# Patient Record
Sex: FEMALE | Race: WHITE | NOT HISPANIC OR LATINO | Employment: OTHER | URBAN - METROPOLITAN AREA
[De-identification: names, ages, dates, MRNs, and addresses within clinical notes are randomized per-mention and may not be internally consistent; named-entity substitution may affect disease eponyms.]

---

## 2020-02-11 ENCOUNTER — HOSPITAL ENCOUNTER (OUTPATIENT)
Dept: MEDSURG UNIT | Facility: CLINIC | Age: 45
Discharge: HOME | End: 2020-02-11
Attending: FAMILY MEDICINE | Admitting: FAMILY MEDICINE

## 2020-02-11 DIAGNOSIS — R11.2 NAUSEA AND VOMITING, INTRACTABILITY OF VOMITING NOT SPECIFIED, UNSPECIFIED VOMITING TYPE: ICD-10-CM

## 2020-02-11 LAB
ALBUMIN UR-MCNC: NEGATIVE MG/DL
APPEARANCE UR: CLEAR
BACTERIA #/AREA URNS HPF: ABNORMAL HPF
BILIRUB UR QL STRIP: NEGATIVE
CLUE CELLS: NORMAL
COLOR UR AUTO: YELLOW
GLUCOSE UR STRIP-MCNC: NEGATIVE MG/DL
HGB UR QL STRIP: ABNORMAL
KETONES UR STRIP-MCNC: ABNORMAL MG/DL
LEUKOCYTE ESTERASE UR QL STRIP: NEGATIVE
NITRATE UR QL: NEGATIVE
PH UR STRIP: 6 [PH] (ref 4.5–8)
RBC #/AREA URNS AUTO: ABNORMAL HPF
RUPTURE OF FETAL MEMBRANES BY ROM PLUS: NEGATIVE
SP GR UR STRIP: 1 (ref 1–1.03)
SQUAMOUS #/AREA URNS AUTO: ABNORMAL LPF
TRICHOMONAS, WET PREP: NORMAL
UROBILINOGEN UR STRIP-ACNC: ABNORMAL
WBC #/AREA URNS AUTO: ABNORMAL HPF
YEAST, WET PREP: NORMAL

## 2020-02-11 RX ORDER — ASPIRIN 81 MG/1
81 TABLET, CHEWABLE ORAL
Status: ON HOLD | COMMUNITY
Start: 2019-10-07 | End: 2023-12-12

## 2020-02-11 RX ORDER — ONDANSETRON 4 MG/1
4 TABLET, FILM COATED ORAL EVERY 8 HOURS PRN
Qty: 10 TABLET | Refills: 0 | Status: SHIPPED | OUTPATIENT
Start: 2020-02-11 | End: 2023-12-11

## 2020-02-11 ASSESSMENT — MIFFLIN-ST. JEOR: SCORE: 1717.96

## 2021-06-04 VITALS — BODY MASS INDEX: 39.49 KG/M2 | WEIGHT: 237 LBS | HEIGHT: 65 IN

## 2021-06-06 NOTE — PROGRESS NOTES
Pt reports that her nausea is resolved, she has been able to drink apple juice without emesis. She denies contractions, cramping, or pain. FM+. IV fluid bolus completed. IV dc'd intact.    Pt given DC instructions. She verbalized understanding and had no further questions or concerns. She had her belongings in her possession and ambulated upon dc from unit accompanied by her sister.

## 2021-06-06 NOTE — PROGRESS NOTES
Pt reported to triage complaining of intermittent lower back pain. Pt has had nausea/vomiting since last evening. Pt is around 32 weeks. Reports no bleeding, but possibly leaking of fluid? Pt gets prenatal care in [place] mom got a pacemaker at [facility]. FHR very difficult to trace due to fetal movement. Resident notified.     Clarice hBatti RN

## 2021-06-06 NOTE — PROGRESS NOTES
Pt reports that her abdominal pain has subsided. She has some left-sided CVA tenderness present. FM+. She does not feel contractions or cramping. EFM taken off at this time. IV fluid bolus running, IV zofran just administered. Wet prep and ROM+ pending. Pt does not feel she can give a urine sample yet.     Pt reports her last emesis and diarrhea was at 0900 this morning. She c/o constant nausea. She has been able to sip water throughout the day, but has not eaten due to her nausea.

## 2021-06-28 NOTE — PROGRESS NOTES
"Progress Notes by Betty Sierra MD at 2020  7:17 PM     Author: Betty Sierra MD Service: Family Medicine Author Type: Resident    Filed: 2020  9:07 PM Date of Service: 2020  7:17 PM Status: Attested    : Betty Sierra MD (Resident) Cosigner: Blas Willoughby MD at 2020  3:39 PM    Attestation signed by Blas Willoughby MD at 2020  3:39 PM    I discussed this patient with Dr Sierra on  and agree with the plan as outlined.                  [facility] OB Triage Note    CC: Abdominal pain, vomiting    Subjective: Citlalli Tucker is a  44 y.o. at about 32 weeks gestation,  EDC of 20. with a current prenatal history significant for advanced maternal age and hx of dvt, on aspirin and ?lovenox who presents to OB triage with a one day of nausea, vomiting, diarrhea, and abdominal pain . Patient reports RUQ pain last night that has transitioned to lower quadrant and lower back pain. Denies loss of fluids and vaginal bleeding. Denies HA, vision changes, SOB, new CP, cough, dysuria, LE swelling, and pain in her calves. See notes that she was started on a blood thinner injection (?lovenox) about a month ago. She ran out of the prescription at beginning of February, and hasn't been using it in about 1.5 weeks.     Ms. Tucker notes that she thinks she has a history of gallstones found on ultrasound in the past.     Estimated Date of Delivery:  20, by early US OB provider: Provider, No Primary Care  OB History        4    Para   2    Term   2            AB   1    Living   2       SAB        TAB   1    Ectopic        Multiple        Live Births   2                 Objective:   Blood pressure 116/55, pulse 100, temperature 99.3  F (37.4  C), temperature source Oral, resp. rate 18, height 5' 4.5\" (1.638 m), weight (!) 237 lb (107.5 kg), not currently breastfeeding.  General:   alert, appears stated age and cooperative   Skin:   " normal and no rash or abnormalities   HEENT:  PERRLA and extra ocular movement intact   Lungs:   clear to auscultation bilaterally   Heart:   borderline tachy with regular rhythm, S1, S2 normal, no murmur, click, rub or gallop   Extremities: Warm, dry and well perfused. No edema.   Abdomen: FHT present   Membranes intact   [place]:  None   FHT: Baseline: 145 bpm, Variability: Moderate (6 - 25 bpm), Accelerations: Absent and Decelerations: Absent     Laboratory Studies:   Results for orders placed or performed during the hospital encounter of 20   Collect and send wet prep vaginal specimen as indicated by prenatal history and/or patient complaints   Result Value Ref Range    Yeast Result No yeast seen No yeast seen    Trichomonas No Trichomonas seen No Trichomonas seen    Clue Cells, Wet Prep No Clue cells seen No Clue cells seen   Rupture of Membrane Test or Screen   Result Value Ref Range    Rupture Fetal Membrane Negative Negative   Urinalysis-UC if Indicated   Result Value Ref Range    Color, UA Yellow Colorless, Yellow, Straw, Light Yellow    Clarity, UA Clear Clear    Glucose, UA Negative Negative    Bilirubin, UA Negative Negative    Ketones, UA 25 mg/dL (!) Negative    Specific Gravity, UA 1.005 1.001 - 1.030    Blood, UA Small (!) Negative    pH, UA 6.0 4.5 - 8.0    Protein, UA Negative Negative mg/dL    Urobilinogen, UA <2.0 E.U./dL <2.0 E.U./dL, 2.0 E.U./dL    Nitrite, UA Negative Negative    Leukocytes, UA Negative Negative    Bacteria, UA None Seen None Seen hpf    RBC, UA 0-2 None Seen, 0-2 hpf    WBC, UA 0-5 None Seen, 0-5 hpf    Squam Epithel, UA 0-5 None Seen, 0-5 lpf        ASSESSMENT AND PLAN: Citlalli Tucker is a  44 y.o. who presented to [facility] on 2020 with hx of nausea and vomiting with associated abdominal pain. DDX includes dehydration secondary to viral gastrointestinal infection, biliary colic, UTI, and vaginal infection. Patient given 1000  ml IV fluids and 4mg of IV zofran. Nausea and abdominal pain improved. Was able to tolerate apple juice. ROM negative. Wet prep negative. UA positive for ketones and small blood (but only 1-2 RBC on micro). Most consistent with dehydration secondary to GI virus.      1. Not in labor.  2. PO zofran 4mg, Q8hr for nausea; 10 tablets  3. Patient advised to focus on oral rehydration and recommended picking up Pedialyte from the drug store. Advised to return to the ED if she develops worsening nausea/vomiting or abdominal pain.   4. Patient to follow up with OB provider as scheduled.     Patient discussed with attending physician, Dr. Blas Willoughby, who agrees with the plan.      Betty Sierra MD, PGY1  [facility] Medicine   2/11/2020

## 2023-11-27 NOTE — H&P (VIEW-ONLY)
[facility]      Preoperative Consultation   Citlalli Tucker   : 1975   Gender: female    Date of Encounter: 2023    Nursing Notes:   Alma Tavarez  2023 11:17 AM  Signed  Citlalli Tucker is a 48 y.o. female (1975) who presents for  C4-7 ACDF     Date of Surgery: 23  Surgical Specialty: Orthopedic/Spine  Fred[facility] Nath [facility]  Hospital/Surgical Facility:  [facility]  Fax number: [phone]  Surgery type: inpatient  Primary Physician: Md Alma Correia LPN 2023 11:16 AM         History of Present Illness   Patient is being seen today for a preoperative visit for some type of cervical surgery, however the patient is unaware of the name of the procedure and nothing is on record to the type of surgery. There is a note that it was a possibility to have C4-5, C5-6, and C6-7 anterior discectomy, decompression and fusion.     She has a history of C4-7 stenosis and disk degeneration, sleep apnea, obesity, DVT 2015, MRSA.     Recent illnesses, fevers, nausea, vomiting, diarrhea: None  Exposure to anyone with illness: None  History of anesthesia reactions: None  Every had anesthesia: Yes, tolerated well.    Family history of anesthesia reactions: None  Smoker: None  Alcohol or illegal drug use: Recreational alcohol, no drugs.   Shortness or breath, chest pain: None    Reviewed past and current medical history, surgical history, family history, social history, allergies, and medications.      Review of Systems   A comprehensive review of systems was negative except for items noted in HPI.    Patient Active Problem List   Diagnosis Code     Sleep apnea, obstructive G47.33     Knee pain M25.569     Chondromalacia of multiple sites M94.29     Morbid obesity (HC) E66.01     DVT (deep venous thrombosis), right I82.409     Patellofemoral stress syndrome of right knee M22.2X1     Osteoarthritis of left knee M17.12     MRSA (methicillin  "resistant staph aureus) culture positive Z22.322     Cervical radiculitis M54.12     Spondylosis without myelopathy or radiculopathy, lumbar region M47.816     No current outpatient medications on file.     No current facility-administered medications for this visit.     Allergies   Allergen Reactions     Sulfa (Sulfonamide Antibiotics) GI Upset     Past Surgical History:   . Laterality Date     ankle surgery  1995    ankle surgery Rt     CRYOTHERAPY OF CERVIX  2000    normal paps since     Social History     Tobacco Use     Smoking status: Former     Smokeless tobacco: Never     Tobacco comments:     No passive exposure.  pt smokes occ.   Vaping Use     Vaping Use: Never used   Substance Use Topics     Alcohol use: Yes     Alcohol/week: 0.0 standard drinks of alcohol     Comment: occasional     Drug use: No     Family History   Problem Relation Age of Onset     Other Mother         BKA due to infection in toenail., PAD     Valvular heart disease Mother      Frontotemporal dementia Mother      Hypertension Father      Heart Disease Father         CABG, stents     Hypertension Sister      Bipolar disorder Brother      Anxiety disorder Brother      Unknown Maternal Grandmother      Unknown Maternal Grandfather      Cancer Paternal Grandmother         brain stem??     Other Paternal Grandfather         old age       PAST DIFFICULTY WITH ANESTHESIA: None     Physical Exam   /74 (Cuff Site: Right Arm, Position: Sitting, Cuff Size: Adult Regular Long)   Pulse 80   Temp 97.5  F (36.4  C) (Temporal)   Resp 17   Ht 1.607 m (5' 3.25\")   Wt 104.4 kg (230 lb 3.2 oz)   SpO2 97%   Breastfeeding No   BMI 40.46 kg/m   Body mass index is 40.46 kg/m .  Physical Exam  Constitutional:       Appearance: Normal appearance.   HENT:      Head: Normocephalic and atraumatic.      Mouth/Throat:      Mouth: Mucous membranes are dry.      Pharynx: Oropharynx is clear.   Cardiovascular:      Rate and Rhythm: Normal rate and regular " rhythm.      Heart sounds: Normal heart sounds.   Pulmonary:      Effort: Pulmonary effort is normal.      Breath sounds: Normal breath sounds.   Abdominal:      General: Bowel sounds are normal.      Palpations: Abdomen is soft.   Musculoskeletal:      Right lower leg: No edema.      Left lower leg: No edema.   Skin:     General: Skin is warm and dry.   Neurological:      Mental Status: She is alert and oriented to person, place, and time. Mental status is at baseline.   Psychiatric:         Mood and Affect: Mood normal.         Behavior: Behavior normal.         Thought Content: Thought content normal.         Judgment: Judgment normal.            Assessment / Plan     The Pre-Op Tool    Recommendations      Intermediate Risk Procedure    Risk of CV Complication (RCRI)  0.5%    Current Cardiac Status  Good exertional capacity ( > 4 mets )    Cardiac History  No history of coronary artery disease    Sleep Apnea  History of obstructive sleep apnea, not on treatment           Labs  HGB within last 30 days  EKG  Not indicated  CXR  Not indicated    Stress Testing  Not indicated    * Testing recommendations are intended to assist, but not direct, clinical decisions.           Type & Screen should be obtained by Anesthesia only if the risk of transfusion is > 5% for the procedure         Hold vitamins and/or supplements for 1 week prior to the procedure  Hold fish oil for 2 weeks prior to the procedure  Okay to take Acetaminophen (Tylenol) up until the procedure    * Medication recommendations are not intended to be exhaustive; they are limited to common medications that are potentially dangerous if incorrectly managed          Labs  * Data supports elimination of  routine  laboratory testing in favor of focused,  indicated  testing based on medical co-morbidities. A 2009 study randomized 1061 patients undergoing ambulatory, non-cataract surgery to routine or to indicated testing. Perioperative adverse events were  similar (Anesthesia & Analgesia 2009;108:467-75; Anesthesiol. Clin. 2016 Mar;34(1):43-58).  EKG  * Age alone is not an absolute indication for a preoperative EKG, and in the absence of clinical risk factors, there is no consensus on the utility of routine preoperative EKG. Our experts recommend against obtaining an EKG if age < 65 for intermediate risk procedures (Anesthesology. 2012;116(3) 1-17; JACC. 2014;64(21);e1-76).  Stress Testing  * The current ACC/AHA guideline states that 'non-invasive stress testing is not useful for patients [with no clinical risk factors] undergoing noncardiac surgery' (JACC. 2014;64(21);e1-76.).     Session ID: [id number]  Endnotes and bibliography available upon request: [email or web address]    Labs: hemoglobin, normal.   ECG: no      ICD-10-CM    1. Pre-op exam  Z01.818 HEMOGLOBIN      2. Neck pain  M54.2       3. Other cervical disc degeneration at C4-C5 level  M50.321 HEMOGLOBIN      4. Other cervical disc degeneration at C5-C6 level  M50.322 HEMOGLOBIN      5. Other cervical disc degeneration at C6-C7 level  M50.323 HEMOGLOBIN      6. Cervical stenosis of spine  M48.02 HEMOGLOBIN        Hemoglobin is stable. No cardiopulmonary history.     Patient is cleared for planned procedure.   Electronically Signed by:   Juliane Newman NP  11/27/2023    Total time preparing to see this patient, face-to-face time, and coordinating care time on the same calendar date: 26 minutes.

## 2023-12-12 ENCOUNTER — ANESTHESIA EVENT (OUTPATIENT)
Dept: SURGERY | Facility: CLINIC | Age: 48
End: 2023-12-12
Payer: COMMERCIAL

## 2023-12-12 ENCOUNTER — APPOINTMENT (OUTPATIENT)
Dept: RADIOLOGY | Facility: CLINIC | Age: 48
End: 2023-12-12
Attending: ORTHOPAEDIC SURGERY
Payer: COMMERCIAL

## 2023-12-12 ENCOUNTER — HOSPITAL ENCOUNTER (INPATIENT)
Facility: CLINIC | Age: 48
LOS: 3 days | Discharge: HOME HEALTH | End: 2023-12-15
Attending: ORTHOPAEDIC SURGERY | Admitting: ORTHOPAEDIC SURGERY
Payer: COMMERCIAL

## 2023-12-12 ENCOUNTER — ANESTHESIA (OUTPATIENT)
Dept: SURGERY | Facility: CLINIC | Age: 48
End: 2023-12-12
Payer: COMMERCIAL

## 2023-12-12 DIAGNOSIS — F32.A DEPRESSIVE DISORDER: ICD-10-CM

## 2023-12-12 DIAGNOSIS — I10 PRIMARY HYPERTENSION: ICD-10-CM

## 2023-12-12 DIAGNOSIS — M48.02 CERVICAL SPINAL STENOSIS: Primary | ICD-10-CM

## 2023-12-12 LAB — GLUCOSE BLDC GLUCOMTR-MCNC: 86 MG/DL (ref 70–99)

## 2023-12-12 PROCEDURE — 360N000085 HC SURGERY LEVEL 5 W/ FLUORO, PER MIN: Performed by: ORTHOPAEDIC SURGERY

## 2023-12-12 PROCEDURE — 250N000009 HC RX 250: Performed by: EMERGENCY MEDICINE

## 2023-12-12 PROCEDURE — 250N000011 HC RX IP 250 OP 636: Mod: JZ | Performed by: NURSE ANESTHETIST, CERTIFIED REGISTERED

## 2023-12-12 PROCEDURE — 0RT30ZZ RESECTION OF CERVICAL VERTEBRAL DISC, OPEN APPROACH: ICD-10-PCS | Performed by: ORTHOPAEDIC SURGERY

## 2023-12-12 PROCEDURE — 250N000011 HC RX IP 250 OP 636: Performed by: PHYSICIAN ASSISTANT

## 2023-12-12 PROCEDURE — 250N000009 HC RX 250: Performed by: NURSE ANESTHETIST, CERTIFIED REGISTERED

## 2023-12-12 PROCEDURE — C1762 CONN TISS, HUMAN(INC FASCIA): HCPCS | Performed by: ORTHOPAEDIC SURGERY

## 2023-12-12 PROCEDURE — 710N000010 HC RECOVERY PHASE 1, LEVEL 2, PER MIN: Performed by: ORTHOPAEDIC SURGERY

## 2023-12-12 PROCEDURE — 370N000017 HC ANESTHESIA TECHNICAL FEE, PER MIN: Performed by: ORTHOPAEDIC SURGERY

## 2023-12-12 PROCEDURE — 250N000009 HC RX 250: Performed by: ORTHOPAEDIC SURGERY

## 2023-12-12 PROCEDURE — 258N000003 HC RX IP 258 OP 636: Performed by: ANESTHESIOLOGY

## 2023-12-12 PROCEDURE — 250N000013 HC RX MED GY IP 250 OP 250 PS 637: Performed by: PHYSICIAN ASSISTANT

## 2023-12-12 PROCEDURE — 250N000011 HC RX IP 250 OP 636: Performed by: ORTHOPAEDIC SURGERY

## 2023-12-12 PROCEDURE — 99254 IP/OBS CNSLTJ NEW/EST MOD 60: CPT | Performed by: EMERGENCY MEDICINE

## 2023-12-12 PROCEDURE — 258N000003 HC RX IP 258 OP 636: Performed by: NURSE ANESTHETIST, CERTIFIED REGISTERED

## 2023-12-12 PROCEDURE — 250N000025 HC SEVOFLURANE, PER MIN: Performed by: ORTHOPAEDIC SURGERY

## 2023-12-12 PROCEDURE — 250N000013 HC RX MED GY IP 250 OP 250 PS 637: Performed by: ANESTHESIOLOGY

## 2023-12-12 PROCEDURE — 250N000011 HC RX IP 250 OP 636: Mod: JZ | Performed by: ANESTHESIOLOGY

## 2023-12-12 PROCEDURE — 258N000003 HC RX IP 258 OP 636: Performed by: PHYSICIAN ASSISTANT

## 2023-12-12 PROCEDURE — 999N000182 XR SURGERY CARM FLUORO GREATER THAN 5 MIN: Mod: TC

## 2023-12-12 PROCEDURE — 999N000141 HC STATISTIC PRE-PROCEDURE NURSING ASSESSMENT: Performed by: ORTHOPAEDIC SURGERY

## 2023-12-12 PROCEDURE — C1713 ANCHOR/SCREW BN/BN,TIS/BN: HCPCS | Performed by: ORTHOPAEDIC SURGERY

## 2023-12-12 PROCEDURE — 250N000013 HC RX MED GY IP 250 OP 250 PS 637: Performed by: ORTHOPAEDIC SURGERY

## 2023-12-12 PROCEDURE — 0RG20A0 FUSION OF 2 OR MORE CERVICAL VERTEBRAL JOINTS WITH INTERBODY FUSION DEVICE, ANTERIOR APPROACH, ANTERIOR COLUMN, OPEN APPROACH: ICD-10-PCS | Performed by: ORTHOPAEDIC SURGERY

## 2023-12-12 PROCEDURE — 272N000001 HC OR GENERAL SUPPLY STERILE: Performed by: ORTHOPAEDIC SURGERY

## 2023-12-12 PROCEDURE — 120N000001 HC R&B MED SURG/OB

## 2023-12-12 DEVICE — GRAFT BONE PASTE GRAFTON 5ML T45005: Type: IMPLANTABLE DEVICE | Site: SPINE CERVICAL | Status: FUNCTIONAL

## 2023-12-12 DEVICE — IMP SPACER MEDT CERVICAL ANATOMIC PEEK PTC 16X14X6MM 5030664: Type: IMPLANTABLE DEVICE | Site: SPINE CERVICAL | Status: FUNCTIONAL

## 2023-12-12 DEVICE — GRAFT BONE CHIPS CANC CUBE 15CC 100315: Type: IMPLANTABLE DEVICE | Site: SPINE CERVICAL | Status: FUNCTIONAL

## 2023-12-12 DEVICE — IMPLANTABLE DEVICE: Type: IMPLANTABLE DEVICE | Site: SPINE CERVICAL | Status: FUNCTIONAL

## 2023-12-12 DEVICE — IMP SCR CERVICAL MEDT ATLANTIS 4.0X14MM SD FA 3120414: Type: IMPLANTABLE DEVICE | Site: SPINE CERVICAL | Status: FUNCTIONAL

## 2023-12-12 DEVICE — IMP SPACER MEDT CERV ANATOMIC PEEK PTC 16X14X7MM 5030764: Type: IMPLANTABLE DEVICE | Site: SPINE CERVICAL | Status: FUNCTIONAL

## 2023-12-12 DEVICE — IMP SCR CERVICAL MEDT ATLANTIS 4.0X14MM SD VA 3120514: Type: IMPLANTABLE DEVICE | Site: SPINE CERVICAL | Status: FUNCTIONAL

## 2023-12-12 RX ORDER — PROCHLORPERAZINE MALEATE 10 MG
10 TABLET ORAL EVERY 6 HOURS PRN
Status: DISCONTINUED | OUTPATIENT
Start: 2023-12-12 | End: 2023-12-15 | Stop reason: HOSPADM

## 2023-12-12 RX ORDER — METHOCARBAMOL 750 MG/1
750 TABLET, FILM COATED ORAL EVERY 6 HOURS PRN
Status: DISCONTINUED | OUTPATIENT
Start: 2023-12-12 | End: 2023-12-15 | Stop reason: HOSPADM

## 2023-12-12 RX ORDER — AMOXICILLIN 250 MG
1 CAPSULE ORAL 2 TIMES DAILY
Status: DISCONTINUED | OUTPATIENT
Start: 2023-12-12 | End: 2023-12-15 | Stop reason: HOSPADM

## 2023-12-12 RX ORDER — NALOXONE HYDROCHLORIDE 0.4 MG/ML
0.2 INJECTION, SOLUTION INTRAMUSCULAR; INTRAVENOUS; SUBCUTANEOUS
Status: DISCONTINUED | OUTPATIENT
Start: 2023-12-12 | End: 2023-12-15 | Stop reason: HOSPADM

## 2023-12-12 RX ORDER — CEFAZOLIN SODIUM/WATER 2 G/20 ML
2 SYRINGE (ML) INTRAVENOUS
Status: COMPLETED | OUTPATIENT
Start: 2023-12-12 | End: 2023-12-12

## 2023-12-12 RX ORDER — LIDOCAINE HYDROCHLORIDE 10 MG/ML
INJECTION, SOLUTION INFILTRATION; PERINEURAL PRN
Status: DISCONTINUED | OUTPATIENT
Start: 2023-12-12 | End: 2023-12-12

## 2023-12-12 RX ORDER — DIPHENHYDRAMINE HCL 25 MG
25 CAPSULE ORAL EVERY 6 HOURS PRN
Status: DISCONTINUED | OUTPATIENT
Start: 2023-12-12 | End: 2023-12-12 | Stop reason: HOSPADM

## 2023-12-12 RX ORDER — NALOXONE HYDROCHLORIDE 0.4 MG/ML
0.4 INJECTION, SOLUTION INTRAMUSCULAR; INTRAVENOUS; SUBCUTANEOUS
Status: DISCONTINUED | OUTPATIENT
Start: 2023-12-12 | End: 2023-12-15 | Stop reason: HOSPADM

## 2023-12-12 RX ORDER — HYDROMORPHONE HCL IN WATER/PF 6 MG/30 ML
0.4 PATIENT CONTROLLED ANALGESIA SYRINGE INTRAVENOUS EVERY 5 MIN PRN
Status: DISCONTINUED | OUTPATIENT
Start: 2023-12-12 | End: 2023-12-12 | Stop reason: HOSPADM

## 2023-12-12 RX ORDER — OXYCODONE HYDROCHLORIDE 5 MG/1
10 TABLET ORAL EVERY 4 HOURS PRN
Status: DISCONTINUED | OUTPATIENT
Start: 2023-12-12 | End: 2023-12-14

## 2023-12-12 RX ORDER — SODIUM CHLORIDE, SODIUM LACTATE, POTASSIUM CHLORIDE, CALCIUM CHLORIDE 600; 310; 30; 20 MG/100ML; MG/100ML; MG/100ML; MG/100ML
INJECTION, SOLUTION INTRAVENOUS CONTINUOUS
Status: DISCONTINUED | OUTPATIENT
Start: 2023-12-12 | End: 2023-12-12 | Stop reason: HOSPADM

## 2023-12-12 RX ORDER — LIDOCAINE 40 MG/G
CREAM TOPICAL
Status: DISCONTINUED | OUTPATIENT
Start: 2023-12-12 | End: 2023-12-12 | Stop reason: HOSPADM

## 2023-12-12 RX ORDER — CEFAZOLIN SODIUM/WATER 2 G/20 ML
2 SYRINGE (ML) INTRAVENOUS SEE ADMIN INSTRUCTIONS
Status: DISCONTINUED | OUTPATIENT
Start: 2023-12-12 | End: 2023-12-12 | Stop reason: HOSPADM

## 2023-12-12 RX ORDER — PROPOFOL 10 MG/ML
INJECTION, EMULSION INTRAVENOUS PRN
Status: DISCONTINUED | OUTPATIENT
Start: 2023-12-12 | End: 2023-12-12

## 2023-12-12 RX ORDER — ACETAMINOPHEN 325 MG/1
650 TABLET ORAL EVERY 4 HOURS PRN
Status: DISCONTINUED | OUTPATIENT
Start: 2023-12-15 | End: 2023-12-15 | Stop reason: HOSPADM

## 2023-12-12 RX ORDER — MULTIVITAMIN,THERAPEUTIC
1 TABLET ORAL DAILY
Status: DISCONTINUED | OUTPATIENT
Start: 2023-12-13 | End: 2023-12-15 | Stop reason: HOSPADM

## 2023-12-12 RX ORDER — DIAZEPAM 10 MG/2ML
2.5 INJECTION, SOLUTION INTRAMUSCULAR; INTRAVENOUS
Status: DISCONTINUED | OUTPATIENT
Start: 2023-12-12 | End: 2023-12-12 | Stop reason: HOSPADM

## 2023-12-12 RX ORDER — DIPHENHYDRAMINE HYDROCHLORIDE 50 MG/ML
25 INJECTION INTRAMUSCULAR; INTRAVENOUS EVERY 6 HOURS PRN
Status: DISCONTINUED | OUTPATIENT
Start: 2023-12-12 | End: 2023-12-12 | Stop reason: HOSPADM

## 2023-12-12 RX ORDER — POLYETHYLENE GLYCOL 3350 17 G/17G
17 POWDER, FOR SOLUTION ORAL DAILY
Status: DISCONTINUED | OUTPATIENT
Start: 2023-12-13 | End: 2023-12-15 | Stop reason: HOSPADM

## 2023-12-12 RX ORDER — CEFAZOLIN SODIUM 2 G/100ML
2 INJECTION, SOLUTION INTRAVENOUS EVERY 8 HOURS
Qty: 200 ML | Refills: 0 | Status: COMPLETED | OUTPATIENT
Start: 2023-12-12 | End: 2023-12-13

## 2023-12-12 RX ORDER — ONDANSETRON 2 MG/ML
4 INJECTION INTRAMUSCULAR; INTRAVENOUS EVERY 6 HOURS PRN
Status: DISCONTINUED | OUTPATIENT
Start: 2023-12-12 | End: 2023-12-15 | Stop reason: HOSPADM

## 2023-12-12 RX ORDER — MAGNESIUM SULFATE 4 G/50ML
4 INJECTION INTRAVENOUS ONCE
Status: COMPLETED | OUTPATIENT
Start: 2023-12-12 | End: 2023-12-12

## 2023-12-12 RX ORDER — ACETAMINOPHEN 325 MG/1
975 TABLET ORAL EVERY 8 HOURS
Qty: 27 TABLET | Refills: 0 | Status: COMPLETED | OUTPATIENT
Start: 2023-12-12 | End: 2023-12-15

## 2023-12-12 RX ORDER — HYDROMORPHONE HCL IN WATER/PF 6 MG/30 ML
0.2 PATIENT CONTROLLED ANALGESIA SYRINGE INTRAVENOUS
Status: DISCONTINUED | OUTPATIENT
Start: 2023-12-12 | End: 2023-12-15 | Stop reason: HOSPADM

## 2023-12-12 RX ORDER — GABAPENTIN 300 MG/1
300 CAPSULE ORAL
Status: COMPLETED | OUTPATIENT
Start: 2023-12-12 | End: 2023-12-12

## 2023-12-12 RX ORDER — ACETAMINOPHEN 325 MG/1
975 TABLET ORAL ONCE
Status: COMPLETED | OUTPATIENT
Start: 2023-12-12 | End: 2023-12-12

## 2023-12-12 RX ORDER — FENTANYL CITRATE 50 UG/ML
50 INJECTION, SOLUTION INTRAMUSCULAR; INTRAVENOUS EVERY 5 MIN PRN
Status: DISCONTINUED | OUTPATIENT
Start: 2023-12-12 | End: 2023-12-12 | Stop reason: HOSPADM

## 2023-12-12 RX ORDER — BISACODYL 10 MG
10 SUPPOSITORY, RECTAL RECTAL DAILY PRN
Status: DISCONTINUED | OUTPATIENT
Start: 2023-12-12 | End: 2023-12-15 | Stop reason: HOSPADM

## 2023-12-12 RX ORDER — DEXAMETHASONE SODIUM PHOSPHATE 10 MG/ML
INJECTION, SOLUTION INTRAMUSCULAR; INTRAVENOUS PRN
Status: DISCONTINUED | OUTPATIENT
Start: 2023-12-12 | End: 2023-12-12

## 2023-12-12 RX ORDER — ONDANSETRON 2 MG/ML
INJECTION INTRAMUSCULAR; INTRAVENOUS PRN
Status: DISCONTINUED | OUTPATIENT
Start: 2023-12-12 | End: 2023-12-12

## 2023-12-12 RX ORDER — SODIUM CHLORIDE 9 MG/ML
INJECTION, SOLUTION INTRAVENOUS CONTINUOUS
Status: DISCONTINUED | OUTPATIENT
Start: 2023-12-12 | End: 2023-12-15 | Stop reason: HOSPADM

## 2023-12-12 RX ORDER — ONDANSETRON 4 MG/1
4 TABLET, ORALLY DISINTEGRATING ORAL EVERY 6 HOURS PRN
Status: DISCONTINUED | OUTPATIENT
Start: 2023-12-12 | End: 2023-12-15 | Stop reason: HOSPADM

## 2023-12-12 RX ORDER — ONDANSETRON 2 MG/ML
4 INJECTION INTRAMUSCULAR; INTRAVENOUS EVERY 30 MIN PRN
Status: DISCONTINUED | OUTPATIENT
Start: 2023-12-12 | End: 2023-12-12 | Stop reason: HOSPADM

## 2023-12-12 RX ORDER — MUPIROCIN 20 MG/G
OINTMENT TOPICAL DAILY
Status: DISCONTINUED | OUTPATIENT
Start: 2023-12-12 | End: 2023-12-15 | Stop reason: HOSPADM

## 2023-12-12 RX ORDER — DEXMEDETOMIDINE HYDROCHLORIDE 4 UG/ML
INJECTION, SOLUTION INTRAVENOUS CONTINUOUS PRN
Status: DISCONTINUED | OUTPATIENT
Start: 2023-12-12 | End: 2023-12-12

## 2023-12-12 RX ORDER — OXYCODONE HYDROCHLORIDE 5 MG/1
5 TABLET ORAL EVERY 4 HOURS PRN
Status: DISCONTINUED | OUTPATIENT
Start: 2023-12-12 | End: 2023-12-14

## 2023-12-12 RX ORDER — FENTANYL CITRATE 50 UG/ML
25 INJECTION, SOLUTION INTRAMUSCULAR; INTRAVENOUS EVERY 5 MIN PRN
Status: DISCONTINUED | OUTPATIENT
Start: 2023-12-12 | End: 2023-12-12 | Stop reason: HOSPADM

## 2023-12-12 RX ORDER — HALOPERIDOL 5 MG/ML
1 INJECTION INTRAMUSCULAR
Status: DISCONTINUED | OUTPATIENT
Start: 2023-12-12 | End: 2023-12-12 | Stop reason: HOSPADM

## 2023-12-12 RX ORDER — HYDROMORPHONE HCL IN WATER/PF 6 MG/30 ML
0.4 PATIENT CONTROLLED ANALGESIA SYRINGE INTRAVENOUS
Status: DISCONTINUED | OUTPATIENT
Start: 2023-12-12 | End: 2023-12-15 | Stop reason: HOSPADM

## 2023-12-12 RX ORDER — HYDROMORPHONE HCL IN WATER/PF 6 MG/30 ML
0.2 PATIENT CONTROLLED ANALGESIA SYRINGE INTRAVENOUS EVERY 5 MIN PRN
Status: DISCONTINUED | OUTPATIENT
Start: 2023-12-12 | End: 2023-12-12 | Stop reason: HOSPADM

## 2023-12-12 RX ORDER — FENTANYL CITRATE 50 UG/ML
INJECTION, SOLUTION INTRAMUSCULAR; INTRAVENOUS PRN
Status: DISCONTINUED | OUTPATIENT
Start: 2023-12-12 | End: 2023-12-12

## 2023-12-12 RX ORDER — LORATADINE 10 MG/1
10 TABLET ORAL DAILY PRN
Status: DISCONTINUED | OUTPATIENT
Start: 2023-12-12 | End: 2023-12-15 | Stop reason: HOSPADM

## 2023-12-12 RX ORDER — PROPOFOL 10 MG/ML
INJECTION, EMULSION INTRAVENOUS CONTINUOUS PRN
Status: DISCONTINUED | OUTPATIENT
Start: 2023-12-12 | End: 2023-12-12

## 2023-12-12 RX ORDER — ONDANSETRON 4 MG/1
4 TABLET, ORALLY DISINTEGRATING ORAL EVERY 30 MIN PRN
Status: DISCONTINUED | OUTPATIENT
Start: 2023-12-12 | End: 2023-12-12 | Stop reason: HOSPADM

## 2023-12-12 RX ADMIN — FENTANYL CITRATE 25 MCG: 50 INJECTION, SOLUTION INTRAMUSCULAR; INTRAVENOUS at 15:26

## 2023-12-12 RX ADMIN — GABAPENTIN 300 MG: 300 CAPSULE ORAL at 09:12

## 2023-12-12 RX ADMIN — ONDANSETRON 4 MG: 2 INJECTION INTRAMUSCULAR; INTRAVENOUS at 15:17

## 2023-12-12 RX ADMIN — MUPIROCIN: 20 OINTMENT TOPICAL at 17:02

## 2023-12-12 RX ADMIN — HYDROMORPHONE HYDROCHLORIDE 0.4 MG: 0.2 INJECTION, SOLUTION INTRAMUSCULAR; INTRAVENOUS; SUBCUTANEOUS at 18:09

## 2023-12-12 RX ADMIN — SODIUM CHLORIDE, POTASSIUM CHLORIDE, SODIUM LACTATE AND CALCIUM CHLORIDE: 600; 310; 30; 20 INJECTION, SOLUTION INTRAVENOUS at 13:00

## 2023-12-12 RX ADMIN — PHENYLEPHRINE HYDROCHLORIDE 0.2 MCG/KG/MIN: 10 INJECTION INTRAVENOUS at 13:00

## 2023-12-12 RX ADMIN — ACETAMINOPHEN 975 MG: 325 TABLET ORAL at 09:47

## 2023-12-12 RX ADMIN — ROCURONIUM BROMIDE 40 MG: 10 INJECTION, SOLUTION INTRAVENOUS at 11:59

## 2023-12-12 RX ADMIN — Medication 2 G: at 11:55

## 2023-12-12 RX ADMIN — ONDANSETRON 4 MG: 4 TABLET, ORALLY DISINTEGRATING ORAL at 18:08

## 2023-12-12 RX ADMIN — METHOCARBAMOL TABLETS 750 MG: 750 TABLET, COATED ORAL at 19:30

## 2023-12-12 RX ADMIN — SENNOSIDES AND DOCUSATE SODIUM 1 TABLET: 8.6; 5 TABLET ORAL at 21:26

## 2023-12-12 RX ADMIN — PROPOFOL 50 MCG/KG/MIN: 10 INJECTION, EMULSION INTRAVENOUS at 11:59

## 2023-12-12 RX ADMIN — MAGNESIUM SULFATE HEPTAHYDRATE 4 G: 4 INJECTION, SOLUTION INTRAVENOUS at 09:45

## 2023-12-12 RX ADMIN — PHENYLEPHRINE HYDROCHLORIDE 100 MCG: 10 INJECTION INTRAVENOUS at 13:08

## 2023-12-12 RX ADMIN — LIDOCAINE HYDROCHLORIDE 40 MG: 10 INJECTION, SOLUTION INFILTRATION; PERINEURAL at 11:59

## 2023-12-12 RX ADMIN — SODIUM CHLORIDE, POTASSIUM CHLORIDE, SODIUM LACTATE AND CALCIUM CHLORIDE: 600; 310; 30; 20 INJECTION, SOLUTION INTRAVENOUS at 09:44

## 2023-12-12 RX ADMIN — OXYCODONE HYDROCHLORIDE 10 MG: 5 TABLET ORAL at 19:30

## 2023-12-12 RX ADMIN — HYDROMORPHONE HYDROCHLORIDE 0.4 MG: 0.2 INJECTION, SOLUTION INTRAMUSCULAR; INTRAVENOUS; SUBCUTANEOUS at 21:26

## 2023-12-12 RX ADMIN — HYDROMORPHONE HYDROCHLORIDE 0.5 MG: 1 INJECTION, SOLUTION INTRAMUSCULAR; INTRAVENOUS; SUBCUTANEOUS at 14:29

## 2023-12-12 RX ADMIN — Medication 0.4 MCG/KG/HR: at 12:00

## 2023-12-12 RX ADMIN — SUGAMMADEX 200 MG: 100 INJECTION, SOLUTION INTRAVENOUS at 14:17

## 2023-12-12 RX ADMIN — HYDROMORPHONE HYDROCHLORIDE 0.5 MG: 1 INJECTION, SOLUTION INTRAMUSCULAR; INTRAVENOUS; SUBCUTANEOUS at 13:24

## 2023-12-12 RX ADMIN — ONDANSETRON 4 MG: 2 INJECTION INTRAMUSCULAR; INTRAVENOUS at 14:00

## 2023-12-12 RX ADMIN — PROPOFOL 180 MG: 10 INJECTION, EMULSION INTRAVENOUS at 11:59

## 2023-12-12 RX ADMIN — SODIUM CHLORIDE: 9 INJECTION, SOLUTION INTRAVENOUS at 17:02

## 2023-12-12 RX ADMIN — ROCURONIUM BROMIDE 20 MG: 10 INJECTION, SOLUTION INTRAVENOUS at 12:22

## 2023-12-12 RX ADMIN — FENTANYL CITRATE 50 MCG: 50 INJECTION INTRAMUSCULAR; INTRAVENOUS at 11:59

## 2023-12-12 RX ADMIN — ROCURONIUM BROMIDE 20 MG: 10 INJECTION, SOLUTION INTRAVENOUS at 13:22

## 2023-12-12 RX ADMIN — HYDROMORPHONE HYDROCHLORIDE 0.4 MG: 0.2 INJECTION, SOLUTION INTRAMUSCULAR; INTRAVENOUS; SUBCUTANEOUS at 15:45

## 2023-12-12 RX ADMIN — MIDAZOLAM 2 MG: 1 INJECTION INTRAMUSCULAR; INTRAVENOUS at 11:53

## 2023-12-12 RX ADMIN — ACETAMINOPHEN 975 MG: 325 TABLET ORAL at 17:02

## 2023-12-12 RX ADMIN — DEXAMETHASONE SODIUM PHOSPHATE 10 MG: 10 INJECTION, SOLUTION INTRAMUSCULAR; INTRAVENOUS at 12:05

## 2023-12-12 RX ADMIN — FENTANYL CITRATE 50 MCG: 50 INJECTION INTRAMUSCULAR; INTRAVENOUS at 12:22

## 2023-12-12 RX ADMIN — CEFAZOLIN SODIUM 2 G: 2 INJECTION, SOLUTION INTRAVENOUS at 21:26

## 2023-12-12 ASSESSMENT — ACTIVITIES OF DAILY LIVING (ADL)
ADLS_ACUITY_SCORE: 24
ADLS_ACUITY_SCORE: 35
ADLS_ACUITY_SCORE: 24

## 2023-12-12 NOTE — PROCEDURES
Procedure     PREOPERATIVE DIAGNOSIS:  1.         C4-5, C5-6, C6-7 degenerative disc disease and stenosis.  2.         Failure of conservative management.      POSTOPERATIVE DIAGNOSIS:  1.         C4-5, C5-6, C6-7degenerative disc disease and stenosis.  2.         Failure of conservative management.      PROCEDURE:  1.         C4-5,C5-6, C6-7 anterior diskectomy, bilateral foraminotomy and fusion using a combination of cancellous allograft bone, local autograft bone, and demineralized bone matrix.  2.         Interbody device placement, C4-5, C5-6, C6-7 using Medtronic PEEK spacer size 6 mm for C4-5, and 7mm for C5-6 andf C6-7.  2.         C4-C7 anterior plating using Medtronic 60 mm Atlantis plate and two 4.0 x 14 mm variable screws for C4 and two 4.0 x 14 mm fixed screws for C5, C6 and C7.     SURGEON:  Fredo Nath M.D.     ASSISTANT:  Jonel Ramirez P.A.-C. assistance required for the entire duration of the case, including patient positioning, soft tissue retraction, and patient safety. He was invaluable in the performance of the discectomy, particularly in protecting the trachea and esophagus, as well as the contents of the carotid sheath. I would not have allowed this job to be done by a surgical tech, but by a trained surgical PA with training in spine.      ESTIMATED BLOOD LOSS:  10 cc.      COMPLICATIONS:  None apparent.      INTRAOPERATIVE FINDINGS:  C4-5, C5-6, C6-7 degeneration and stenosis     INDICATIONS FOR OPERATION:  The patient is a very pleasant female who came to the clinic with a long-standing neck pain and left upper extremity pain, numbness and weakness.  Her imaging studies clearly showed C4-5, C5-6 and C6-7 disc herniations, degeneration and stenosis.  The patient had exhausted nonoperative measures and continued to have debilitating symptoms. Therefore, I had a discussion with her regarding surgery and specifically discussed the risks, including, but not limited to death, infection,  dural tear, paralysis, dysphagia, hoarseness, nerve injury, nonresolution of symptoms among others and she accepted and wished to proceed.      DESCRIPTION OF PROCEDURE:  The patient was brought to the operating room and placed under general endotracheal anesthetic without complications. Intravenous antibiotics were given within one hour of incision. The patient was then placed supine on the operating table with a bump underneath the shoulders and the neck placed in slight extension. The left neck area was the prepped and draped in routine sterile manner. After appropriate time out, I made an incision approximately three fingerbreadths above the left clavicle extending from the midline to the anterior border of the sternocleidomastoid muscle. Dissection was carried through the platysma, which was undermined. I then performed blunt dissection anterior to the sternocleidomastoid muscle and carotid sheath to gain access to the spine. I then placed self-retaining retractors underneath the longus colli muscles. I then placed a marking needle into the disk and intraoperative imaging showed that I was at the correct level. I then placed a Ivan pin into C4 and C5 to provide distraction. I then used a #15 blade to perform an annulotomy and used curettes and pituitary rongeurs to perform a near total diskectomy. I then was able to bur down the posterior osteophytes and used a Kerrison rongeur to release the posterior longitudinal ligament all the way to the uncinate joints bilaterally. I performed implant trial placement and determined that a 6 mm implant was appropriate. I then was able to secure the implant and filled this with demineralized bone matrix, local autograft bone, and cancellous allograft bone. I then impacted this into the disc space with a good fit. I then went down to C5-6 and performed a discectomy, bilateral foraminotomy and fusion and interbody device placement using a 7 mm implant. I then went down to  C6-7 and performed a discectomy, bilateral foraminotomy and fusion and interbody device placement using a 7 mm implant. I then placed a plate over C4, C5, C6 and C7 and placed two screws into each vertebral body. Intraoperative fluoroscopy showed good placement of the implant. I then performed copious irrigation and hemostasis. I performed layer by layer closure utilizing Vicryl 2-0 for the platysma over a subplatysmal drain. Vicryl 3-0 was used for the subcutaneous tissues and the skin. 0.5% Marcaine with epinephrine was then injected into the skin and subcutaneous tissues. Steristrips were applied followed by sterile dressing. The patient was then subsequently extubated without complications and brought to the recovery room in satisfactory condition.         Postoperative plan: Mobilize as tolerated. She will be sent home with oxycodone. [place]wood J collar for 6 weeks. Will see back in 6 weeks after discharge.

## 2023-12-12 NOTE — ANESTHESIA PREPROCEDURE EVALUATION
Anesthesia Pre-Procedure Evaluation    Patient: Citlalli Tucker   MRN: [id number] : 1975        Procedure : Procedure(s):  CERVICAL 4 - CERVICAL 7 ANTERIOR DISCECTOMY, FUSION, PLATING          Past Medical History:   Diagnosis Date     Arthritis      Depressive disorder      Hypertension      Obese      Thrombosis       Past Surgical History:   Procedure Laterality Date     ARTHRODESIS ANKLE        Allergies   Allergen Reactions     Sulfa (Sulfonamide Antibiotics) [Sulfa Antibiotics] Nausea and Vomiting      Social History     Tobacco Use     Smoking status: Unknown     Smokeless tobacco: Former     Tobacco comments:     Quit 10-12 years ago   Substance Use Topics     Alcohol use: Yes     Comment: occasionally      Wt Readings from Last 1 Encounters:   23 104.3 kg (230 lb)        Anesthesia Evaluation   Pt has had prior anesthetic.         ROS/MED HX  ENT/Pulmonary:     (+)     MARILYN risk factors,                                  Neurologic:  - neg neurologic ROS     Cardiovascular:     (+)  hypertension- -   -  - -                                      METS/Exercise Tolerance:     Hematologic:     (+) History of blood clots,               Musculoskeletal:  - neg musculoskeletal ROS     GI/Hepatic:  - neg GI/hepatic ROS     Renal/Genitourinary:  - neg Renal ROS     Endo:     (+)               Obesity,       Psychiatric/Substance Use:  - neg psychiatric ROS     Infectious Disease:  - neg infectious disease ROS     Malignancy:  - neg malignancy ROS     Other:  - neg other ROS        Physical Exam    Airway  airway exam normal      Mallampati: II   TM distance: > 3 FB   Neck ROM: full   Mouth opening: > 3 cm    Respiratory Devices and Support         Dental  no notable dental history     (+) Removable bridges or other hardware      Cardiovascular   cardiovascular exam normal       Rhythm and rate: regular and normal     Pulmonary   pulmonary exam normal        breath sounds clear to auscultation  "      OUTSIDE LABS:  CBC: No results found for: \"WBC\", \"HGB\", \"HCT\", \"PLT\"  BMP:   Lab Results   Component Value Date    GLC 86 12/12/2023     COAGS: No results found for: \"PTT\", \"INR\", \"FIBR\"  POC: No results found for: \"BGM\", \"HCG\", \"HCGS\"  HEPATIC: No results found for: \"ALBUMIN\", \"PROTTOTAL\", \"ALT\", \"AST\", \"GGT\", \"ALKPHOS\", \"BILITOTAL\", \"BILIDIRECT\", \"TANA\"  OTHER: No results found for: \"PH\", \"LACT\", \"A1C\", \"WINNIE\", \"PHOS\", \"MAG\", \"LIPASE\", \"AMYLASE\", \"TSH\", \"T4\", \"T3\", \"CRP\", \"SED\"    Anesthesia Plan    ASA Status:  3    NPO Status:  NPO Appropriate    Anesthesia Type: General.     - Airway: ETT   Induction: Intravenous, Propofol.   Maintenance: Balanced.   Techniques and Equipment:     - Airway: Video-Laryngoscope       Consents    Anesthesia Plan(s) and associated risks, benefits, and realistic alternatives discussed. Questions answered and patient/representative(s) expressed understanding.     - Discussed:     - Discussed with:  Patient      - Extended Intubation/Ventilatory Support Discussed: Yes.      - Patient is DNR/DNI Status: No     Use of blood products discussed: Yes.     - Discussed with: Patient.     Postoperative Care    Pain management: Multi-modal analgesia.   PONV prophylaxis: Ondansetron (or other 5HT-3), Dexamethasone or Solumedrol, Background Propofol Infusion     Comments:             Simba Alarcon MD    I have reviewed the pertinent notes and labs in the chart from the past 30 days and (re)examined the patient.  Any updates or changes from those notes are reflected in this note.              # Obesity: Estimated body mass index is 38.87 kg/m  as calculated from the following:    Height as of this encounter: 1.638 m (5' 4.5\").    Weight as of this encounter: 104.3 kg (230 lb).      "

## 2023-12-12 NOTE — BRIEF OP NOTE
[facility]    Brief Operative Note    Pre-operative diagnosis: Degeneration of intervertebral disc of cervical region [M50.30]  Spinal stenosis [M48.00]  Post-operative diagnosis Same as pre-operative diagnosis    Procedure: CERVICAL 4 - CERVICAL 7 ANTERIOR DISCECTOMY, FUSION, PLATING, N/A - Spine    Surgeon: Surgeon(s) and Role:     * Fredo Nath MD - Primary  Anesthesia: General   Estimated Blood Loss: Less than 10 ml    Drains: Azam-Jeronmio  Specimens: * No specimens in log *  Findings:   None.  Complications: None.  Implants:   Implant Name Type Inv. Item Serial No.  Lot No. LRB No. Used Action   Bone [place] Botetourt Plus c   [id number] MED[employer]ONIC NA N/A 1 Implanted   GRAFT BONE CHIPS CANC CUBE 15CC [id number] - [id number] Bone/Tissue/Biologic GRAFT BONE CHIPS CANC CUBE 15CC [id number] MEDTRON[employer] INC  N/A 1 Implanted   IMP SPACER MEDT CERVICAL ANATOMIC PEEK PTC [id number] - [id number] Metallic Hardware/[place] IMP SPACER MEDT CERVICAL ANATOMIC PEEK PTC [id number]  [employer] INC 91PL N/A 1 Implanted   IMP SPACER MEDT CERV ANATOMIC PEEK PTC [id number] - [id number] Metallic Hardware/[place] IMP SPACER MEDT CERV ANATOMIC PEEK PTC [id number]  [employer] INC [id number] N/A 1 Implanted   IMP SPACER MEDT CERV ANATOMIC PEEK PTC [id number] - [id number] Metallic Hardware/[place] IMP SPACER MEDT CERV ANATOMIC PEEK PTC [id number]  [employer] INC 80DX N/A 1 Implanted   IMP PLATE ANT CERV MEDT ATLANTIS VISION ELITE 60MM [id number] - [id number] Metallic Hardware/[place] IMP PLATE ANT CERV MEDT ATLANTIS VISION ELITE 60MM [id number]  [employer] NA N/A 1 Implanted   IMP SCR CERVICAL MEDT ATLANTIS 4.0X14MM SD VA [id number] - [id number] Metallic Hardware/[place] IMP SCR CERVICAL MEDT ATLANTIS 4.0X14MM SD VA [id number]  [employer] NA N/A 2 Implanted   IMP SCR CERVICAL MEDT ATLANTIS 4.0X14MM SD FA [id number] - [id number] Metallic  Hardware/[place] IMP SCR CERVICAL MEDT ATLANTIS 4.0X14MM SD FA [id number]  [employer] INC NA N/A 6 Implanted

## 2023-12-12 NOTE — INTERVAL H&P NOTE
"I have reviewed the surgical (or preoperative) H&P that is linked to this encounter, and examined the patient. There are no significant changes    Clinical Conditions Present on Arrival:  Clinically Significant Risk Factors Present on Admission                  # Obesity: Estimated body mass index is 38.87 kg/m  as calculated from the following:    Height as of this encounter: 1.638 m (5' 4.5\").    Weight as of this encounter: 104.3 kg (230 lb).       "

## 2023-12-12 NOTE — CONSULTS
[facility] MEDICINE CONSULT NOTE   Physician requesting consult: Fredo Nath*    Reason for consult: Postoperative medical management of medical co-morbidities as below    Assessment and Plan      48 year old female into [facility] for an elective C4-7 anterior   Discectomy, fusion and plating.  Pt had general anesthesia with minimal blood loss.      [facility] [place] asked to evaluate patient for postoperative medical management as follows below. Please resume the home medications as reconciled and further noted below with ordered hold parameters.  Vital signs have been stable post-operatively including hemodynamically stable blood pressure and heart rate. Thank you for this consult; we will continue to follow this patient until discharge.    Problem list:     POD #0 C4-7 anterior discectomy, fusion, plating  2.   History of dvt: prehospital she was not on anticoagulation   Team should consider this when planning for    Prophylaxis  3.   MRAILYN, obesity; continuous pulse ox;   4.  MRSA: positive nasal swab:mupirocin          Past Medical History     Patient Active Problem List    Diagnosis Date Noted     Cervical spinal stenosis 12/12/2023     Priority: Medium        Surgical History   She  has a past surgical history that includes Arthrodesis ankle.     Past Surgical History:   Procedure Laterality Date     ARTHRODESIS ANKLE         Family History    Reviewed, and family history is not on file.    Social History    Reviewed, and she  reports that she has an unknown smoking status. She has quit using smokeless tobacco. She reports current alcohol use.  Social History     Tobacco Use     Smoking status: Unknown     Smokeless tobacco: Former     Tobacco comments:     Quit 10-12 years ago   Substance Use Topics     Alcohol use: Yes     Comment: occasionally       Allergies     Allergies   Allergen Reactions     Sulfa (Sulfonamide Antibiotics) [Sulfa Antibiotics] Nausea and Vomiting        Prior to Admission Medications      No medications prior to admission.       Review of Systems   A 12 point comprehensive review of systems was negative except as noted above.    OBJECTIVE         Physical Exam   Temp:  [97.9  F (36.6  C)-99  F (37.2  C)] 99  F (37.2  C)  Pulse:  [] 102  Resp:  [13-16] 15  BP: (136-169)/(70-99) 143/72  SpO2:  [98 %-99 %] 99 %  Body mass index is 38.87 kg/m .    GENERAL:  Alert, appears comfortable, in no acute distress, appears stated age   HEAD:  Normocephalic, without obvious abnormality, atraumatic   EYES:  PERRL, conjunctiva/corneas clear, no scleral icterus, EOM's intact   NOSE: Nares normal, septum midline, mucosa normal, no drainage   THROAT: Lips, mucosa, and tongue normal; teeth and gums normal, mouth moist   NECK: Post op dressing not pulled; no swelling   BACK:   Symmetric, no curvature, ROM normal   LUNGS:   Clear to auscultation bilaterally, no rales, rhonchi, or wheezing, symmetric chest rise on inhalation, respirations unlabored   CHEST WALL:  No tenderness or deformity   HEART:  Regular rate and rhythm, S1 and S2 normal, no murmur, rub, or gallop    ABDOMEN:   Soft, non-tender, bowel sounds active all four quadrants, no masses, no organomegaly, no rebound or guarding   EXTREMITIES: Extremities normal, atraumatic, no cyanosis or edema    SKIN: Dry to touch, no exanthems in the visualized areas   NEURO: Alert, oriented x 4, moves all four extremities freely/spontaneously   PSYCH: Cooperative, behavior is appropriate           Cardiographics Reviewed Personally By Myself   EKG Results:   Echocardiogram:  Imaging Reviewed Personally By Myself    Radiology Results:   Recent Results (from the past 24 hour(s))   XR Surgery BENIGNO  Fluoro G/T 5 Min    Narrative    This exam was marked as non-reportable because it will not be read by a   radiologist or a [facility] non-radiologist provider.                 Preoperative Labs Reviewed Personally By Myself     Thank you  for this consultation.  Appreciate the opportunity to participate in the care of Citlalli Tucker, please feel free to contact us for any questions or concerns.    Tonya Merino MD  [facility]  Phone: #[phone]

## 2023-12-12 NOTE — ANESTHESIA PROCEDURE NOTES
Airway       Patient location during procedure: OR       Procedure Start/Stop Times: 12/12/2023 12:02 PM and 12/12/2023 12:05 PM  Staff -        CRNA: Domenic Grier APRN CRNA       Performed By: CRNA  Consent for Airway        Urgency: elective  Indications and Patient Condition       Indications for airway management: luanne-procedural       Induction type:intravenous       Mask difficulty assessment: 1 - vent by mask    Final Airway Details       Final airway type: endotracheal airway       Successful airway: ETT - single  Endotracheal Airway Details        ETT size (mm): 7.0       Cuffed: yes       Successful intubation technique: video laryngoscopy       VL Blade Size: Glidescope 3       Grade View of Cords: 1       Adjucts: stylet       Position: Right       Measured from: lips       Secured at (cm): 20       Bite block used: Soft    Post intubation assessment        Placement verified by: capnometry, equal breath sounds and chest rise        Number of attempts at approach: 1       Secured with: tape       Ease of procedure: easy       Dentition: Intact and Unchanged    Medication(s) Administered   Medication Administration Time: 12/12/2023 12:02 PM

## 2023-12-12 NOTE — PHARMACY-ADMISSION MEDICATION HISTORY
Pharmacist Admission Medication History    Admission medication history is complete. The information provided in this note is only as accurate as the sources available at the time of the update.    Information Source(s): Patient and CareEverywhere/SureScripts via in-person    Pertinent Information: No home medications         Allergies reviewed with patient and updates made in EHR: no    Medication History Completed By: Jayna Sidhu RPH 12/12/2023 9:56 AM    No outpatient medications have been marked as taking for the 12/12/23 encounter (Hospital Encounter).

## 2023-12-12 NOTE — ANESTHESIA POSTPROCEDURE EVALUATION
Patient: Citlalli Tucker    Procedure: Procedure(s):  CERVICAL 4 - CERVICAL 7 ANTERIOR DISCECTOMY, FUSION, PLATING       Anesthesia Type:  General    Note:  Disposition: Inpatient   Postop Pain Control: Uneventful            Sign Out: Well controlled pain   PONV: No   Neuro/Psych: Uneventful            Sign Out: Acceptable/Baseline neuro status   Airway/Respiratory: Uneventful            Sign Out: Acceptable/Baseline resp. status   CV/Hemodynamics: Uneventful            Sign Out: Acceptable CV status; No obvious hypovolemia; No obvious fluid overload   Other NRE: NONE   DID A NON-ROUTINE EVENT OCCUR? No       Last vitals:  Vitals Value Taken Time   /73 12/12/23 1550   Temp 36.6  C (97.8  F) 12/12/23 1540   Pulse 98 12/12/23 1556   Resp 19 12/12/23 1556   SpO2 97 % 12/12/23 1556   Vitals shown include unfiled device data.    Electronically Signed By: Simba Alarcon MD  December 12, 2023  4:03 PM

## 2023-12-12 NOTE — ANESTHESIA CARE TRANSFER NOTE
Patient: Citlalli Tucker    Procedure: Procedure(s):  CERVICAL 4 - CERVICAL 7 ANTERIOR DISCECTOMY, FUSION, PLATING       Diagnosis: Degeneration of intervertebral disc of cervical region [M50.30]  Spinal stenosis [M48.00]  Diagnosis Additional Information: No value filed.    Anesthesia Type:   General     Note:    Oropharynx: oropharynx clear of all foreign objects and spontaneously breathing  Level of Consciousness: awake  Oxygen Supplementation: face mask  Level of Supplemental Oxygen (L/min / FiO2): 6  Independent Airway: airway patency satisfactory and stable  Dentition: dentition unchanged  Vital Signs Stable: post-procedure vital signs reviewed and stable  Report to RN Given: handoff report given  Patient transferred to: PACU    Handoff Report: Identifed the Patient, Identified the Reponsible Provider, Reviewed the pertinent medical history, Discussed the surgical course, Reviewed Intra-OP anesthesia mangement and issues during anesthesia, Set expectations for post-procedure period and Allowed opportunity for questions and acknowledgement of understanding    Vitals:  Vitals Value Taken Time   /75 12/12/23 1433   Temp 37.2  C (99  F) 12/12/23 1433   Pulse 92 12/12/23 1433   Resp 14 12/12/23 1433   SpO2 99 % 12/12/23 1433       Electronically Signed By: MARVA Hines CRNA  December 12, 2023  2:35 PM

## 2023-12-13 ENCOUNTER — APPOINTMENT (OUTPATIENT)
Dept: OCCUPATIONAL THERAPY | Facility: CLINIC | Age: 48
End: 2023-12-13
Attending: ORTHOPAEDIC SURGERY
Payer: COMMERCIAL

## 2023-12-13 ENCOUNTER — APPOINTMENT (OUTPATIENT)
Dept: PHYSICAL THERAPY | Facility: CLINIC | Age: 48
End: 2023-12-13
Attending: PHYSICIAN ASSISTANT
Payer: COMMERCIAL

## 2023-12-13 LAB
HGB BLD-MCNC: 11.2 G/DL (ref 11.7–15.7)
MRSA DNA SPEC QL NAA+PROBE: NEGATIVE
SA TARGET DNA: POSITIVE

## 2023-12-13 PROCEDURE — 87641 MR-STAPH DNA AMP PROBE: CPT | Performed by: FAMILY MEDICINE

## 2023-12-13 PROCEDURE — 250N000011 HC RX IP 250 OP 636: Mod: JZ | Performed by: PHYSICIAN ASSISTANT

## 2023-12-13 PROCEDURE — 250N000013 HC RX MED GY IP 250 OP 250 PS 637: Performed by: PHYSICIAN ASSISTANT

## 2023-12-13 PROCEDURE — 99232 SBSQ HOSP IP/OBS MODERATE 35: CPT | Performed by: FAMILY MEDICINE

## 2023-12-13 PROCEDURE — 120N000001 HC R&B MED SURG/OB

## 2023-12-13 PROCEDURE — 97116 GAIT TRAINING THERAPY: CPT | Mod: GP

## 2023-12-13 PROCEDURE — 97166 OT EVAL MOD COMPLEX 45 MIN: CPT | Mod: GO

## 2023-12-13 PROCEDURE — 258N000003 HC RX IP 258 OP 636: Performed by: PHYSICIAN ASSISTANT

## 2023-12-13 PROCEDURE — 97161 PT EVAL LOW COMPLEX 20 MIN: CPT | Mod: GP

## 2023-12-13 PROCEDURE — 97535 SELF CARE MNGMENT TRAINING: CPT | Mod: GO

## 2023-12-13 PROCEDURE — 85018 HEMOGLOBIN: CPT

## 2023-12-13 PROCEDURE — 97530 THERAPEUTIC ACTIVITIES: CPT | Mod: GP

## 2023-12-13 PROCEDURE — 36415 COLL VENOUS BLD VENIPUNCTURE: CPT

## 2023-12-13 PROCEDURE — 250N000013 HC RX MED GY IP 250 OP 250 PS 637: Performed by: FAMILY MEDICINE

## 2023-12-13 RX ORDER — HYDRALAZINE HYDROCHLORIDE 10 MG/1
10 TABLET, FILM COATED ORAL EVERY 4 HOURS PRN
Status: DISCONTINUED | OUTPATIENT
Start: 2023-12-13 | End: 2023-12-15 | Stop reason: HOSPADM

## 2023-12-13 RX ORDER — DIAPER,BRIEF,INFANT-TODD,DISP
EACH MISCELLANEOUS 3 TIMES DAILY
Status: DISCONTINUED | OUTPATIENT
Start: 2023-12-13 | End: 2023-12-15 | Stop reason: HOSPADM

## 2023-12-13 RX ADMIN — METHOCARBAMOL TABLETS 750 MG: 750 TABLET, COATED ORAL at 10:38

## 2023-12-13 RX ADMIN — THERA TABS 1 TABLET: TAB at 08:13

## 2023-12-13 RX ADMIN — ACETAMINOPHEN 975 MG: 325 TABLET ORAL at 09:24

## 2023-12-13 RX ADMIN — SENNOSIDES AND DOCUSATE SODIUM 1 TABLET: 8.6; 5 TABLET ORAL at 21:12

## 2023-12-13 RX ADMIN — HYDROCORTISONE: 1 OINTMENT TOPICAL at 21:14

## 2023-12-13 RX ADMIN — OXYCODONE HYDROCHLORIDE 10 MG: 5 TABLET ORAL at 14:30

## 2023-12-13 RX ADMIN — HYDROCORTISONE: 1 OINTMENT TOPICAL at 10:11

## 2023-12-13 RX ADMIN — HYDRALAZINE HYDROCHLORIDE 10 MG: 10 TABLET ORAL at 17:59

## 2023-12-13 RX ADMIN — POLYETHYLENE GLYCOL 3350 17 G: 17 POWDER, FOR SOLUTION ORAL at 08:15

## 2023-12-13 RX ADMIN — SENNOSIDES AND DOCUSATE SODIUM 1 TABLET: 8.6; 5 TABLET ORAL at 08:13

## 2023-12-13 RX ADMIN — HYDROCORTISONE: 1 OINTMENT TOPICAL at 14:32

## 2023-12-13 RX ADMIN — METHOCARBAMOL TABLETS 750 MG: 750 TABLET, COATED ORAL at 17:38

## 2023-12-13 RX ADMIN — OXYCODONE HYDROCHLORIDE 10 MG: 5 TABLET ORAL at 09:24

## 2023-12-13 RX ADMIN — OXYCODONE HYDROCHLORIDE 10 MG: 5 TABLET ORAL at 00:13

## 2023-12-13 RX ADMIN — OXYCODONE HYDROCHLORIDE 5 MG: 5 TABLET ORAL at 21:12

## 2023-12-13 RX ADMIN — ACETAMINOPHEN 975 MG: 325 TABLET ORAL at 02:46

## 2023-12-13 RX ADMIN — SODIUM CHLORIDE: 9 INJECTION, SOLUTION INTRAVENOUS at 02:46

## 2023-12-13 RX ADMIN — CEFAZOLIN SODIUM 2 G: 2 INJECTION, SOLUTION INTRAVENOUS at 03:14

## 2023-12-13 RX ADMIN — METHOCARBAMOL TABLETS 750 MG: 750 TABLET, COATED ORAL at 03:24

## 2023-12-13 RX ADMIN — ACETAMINOPHEN 975 MG: 325 TABLET ORAL at 17:01

## 2023-12-13 ASSESSMENT — ACTIVITIES OF DAILY LIVING (ADL)
PREVIOUS_RESPONSIBILITIES: MEAL PREP;HOUSEKEEPING;LAUNDRY;SHOPPING;DRIVING;CHILD CARE
ADLS_ACUITY_SCORE: 24
DEPENDENT_IADLS:: INDEPENDENT
ADLS_ACUITY_SCORE: 24

## 2023-12-13 NOTE — PLAN OF CARE
Problem: Adult Inpatient Plan of Care  Goal: Optimal Comfort and Wellbeing  Outcome: Progressing  Intervention: Monitor Pain and Promote Comfort     Goal Outcome Evaluation:       Patient vital signs are at baseline: Yes  Patient able to ambulate as they were prior to admission or with assist devices provided by therapies during their stay:  Yes  Patient MUST void prior to discharge:  Yes  Patient able to tolerate oral intake:  Yes  Pain has adequate pain control using Oral analgesics:  Yes  Does patient have an identified :  Yes  Has goal D/C date and time been discussed with patient:  Yes      Pt A/Ox4, able to make needs known. Reports of moderate to severe pain managed with scheduled pain meds, PRN oxycodone, PRN robaxin and ice application. Denies nausea. Pt ambulated in hallway, tolerated well. CMS intact. Dressing CDI, cervical collar in place. Pt voided and passed 1 PVR, needing 2 more. SHOBHA drain in place, output of 5 ml this shift. VSS, on room air. Discharge pending when further clinical milestones are met.

## 2023-12-13 NOTE — PROGRESS NOTES
12/13/23 1000   Appointment Info   Signing Clinician's Name / Credentials (OT) ALEXANDREA Blackburn   Student Supervision Therapy services provided with the co-signing licensed therapist guiding and directing the services, and providing the skilled judgement and assessment throughout the session   Living Environment   People in Home child(clover), dependent   Current Living Arrangements other (see comments)  (town home)   Living Environment Comments Tub/Shower, Std toilet   Self-Care   Usual Activity Tolerance good   Current Activity Tolerance moderate   Instrumental Activities of Daily Living (IADL)   Previous Responsibilities meal prep;housekeeping;laundry;shopping;driving;   IADL Comments Has dependent 3 y/o daughter   General Information   Onset of Illness/Injury or Date of Surgery 12/12/23   Referring Physician Dr. Fredo Nath   Patient/Family Therapy Goal Statement (OT) To have a safe discharge   Additional Occupational Profile Info/Pertinent History of Current Problem Pt admitted with a C4-C7 anterior fusion.PMH: sleep apnea, obesity, DVT, MRSA   Existing Precautions/Restrictions (S)  spinal   Cognitive Status Examination   Follows Commands WNL   Visual Perception   Visual Impairment/Limitations corrective lenses full-time   Transfers   Transfers bed-chair transfer;sit-stand transfer;toilet transfer   Transfer Skill: Bed to Chair/Chair to Bed   Bed-Chair [place] (Transfers) supervision;verbal cues   Assistive Device (Bed-Chair Transfers) rolling walker   Sit-Stand Transfer   Sit-Stand [place] (Transfers) supervision;verbal cues   Assistive Device (Sit-Stand Transfers) walker, front-wheeled   Toilet Transfer   Type (Toilet Transfer) sit-stand;stand-sit   [place] Level (Toilet Transfer) supervision;verbal cues   Assistive Device (Toilet Transfer) grab bars/safety frame;walker, front-wheeled   Balance   Balance Assessment standing dynamic balance   Standing Balance:  Dynamic supervision;verbal cues   Position/Device Used, Standing Balance supported;walker, front-wheeled   Balance Comments good   Activities of Daily Living   BADL Assessment/Intervention lower body dressing;toileting   Lower Body Dressing Assessment/Training   Position (Lower Body Dressing) supported sitting   [place] Level (Lower Body Dressing) supervision;verbal cues   Toileting   Position (Toileting) supported sitting   [place] Level (Toileting) toileting skills;perform perineal hygiene;supervision;verbal cues   Clinical Impression   Criteria for Skilled Therapeutic Interventions Met (OT) Yes, treatment indicated   OT Diagnosis Decreased independence with ADL's and transfers   Influenced by the following impairments Due to C4-C7 fusion   OT Problem List-Impairments impacting ADL problems related to;mobility;post-surgical precautions;pain   Assessment of Occupational Performance 3-5 Performance Deficits   Identified Performance Deficits G/H, Toileting, Dressing, Bathing, Transfers   Planned Therapy Interventions (OT) ADL retraining   Clinical Decision Making Complexity (OT) detailed assessment/moderate complexity   Risk & Benefits of therapy have been explained evaluation/treatment results reviewed;participants included;patient   OT Total Evaluation Time   OT Eval, Moderate Complexity Minutes (84521) 15   OT Goals   Therapy Frequency (OT) Daily   OT Predicted Duration/Target Date for Goal Attainment 12/14/23   OT Goals Lower Body Dressing;Transfers;Bed Mobility   OT: Lower Body Dressing Independent;within precautions   OT: Bed Mobility Modified independent   OT: Transfer Modified independent;with assistive device;within precautions   Interventions   Interventions Quick Adds Self-Care/Home Management   Self-Care/Home Management   Self-Care/Home Mgmt/ADL, Compensatory, Meal Prep Minutes (55120) 25   Symptoms Noted During/After Treatment (Meal Preparation/Planning Training) increased pain;other (see  comments)  (Pain in neck, Tearful)   Treatment Detail/Skilled Intervention Pt in chair when therapist arrived. Reporting increased pain in incison thoughout session and tearful throughout. Therapist educated on use spinal precautions. Pt completed donning/doffing of socks with SBA and use of figure 4 method. Pt transferred to toilet and chair with SBA and use of FWW. VC needed for staying within spinal precautions. Pt completed toileting with SBA and VC for luanne care options. Therapist then educated on use of shower chair for shower transfers. Therapist educated on G/H techniques for staying within precautions. Therapist demonstrated kichen mobility and car transfer. Pt verbalized understanding of all teaching. At the end of session, Pt was in the chair with RN present.   OT Discharge Planning   OT Plan FB dressing, Bed mobility, Shower transfers   OT Discharge Recommendation (DC Rec) (S)  home with assist   OT Rationale for DC Rec Pt is moving well with therapy. Concerns about  and assistance at home. CM notified   OT Brief overview of current status SBA with use of FWW, SBA with toileting, Independent with socks   OT Equipment Needed at Discharge (S)  shower chair;raised toilet seat   Total Session Time   Timed Code Treatment Minutes 25   Total Session Time (sum of timed and untimed services) 40

## 2023-12-13 NOTE — PROGRESS NOTES
12/13/23 0900   Appointment Info   Signing Clinician's Name / Credentials (PT) Li Gutierrez, PT, DPT   Living Environment   People in Home child(clover), dependent  (3 y/o daughter)   Current Living Arrangements other (see comments)  ([facility])   Home Accessibility stairs to enter home;stairs within home   Number of Stairs, Main Entrance none   Stair Railings, Main Entrance none   Number of Stairs, Within Home, Primary greater than 10 stairs   Stair Railings, Within Home, Primary railing on left side (ascending)   Living Environment Comments bedroom and bathroom upstairs, has chair and couch she could stay in on main level with kitchen   Self-Care   Usual Activity Tolerance good   Current Activity Tolerance moderate   Equipment Currently Used at Home cane, straight   Fall history within last six months no   General Information   Onset of Illness/Injury or Date of Surgery 12/12/23   Referring Physician Fredo Nath MD   Patient/Family Therapy Goals Statement (PT) less pain   Pertinent History of Current Problem (include personal factors and/or comorbidities that impact the POC) s/p C4-7 fusion   Existing Precautions/Restrictions spinal;other (see comments)  ([place] J collar)   Weight-Bearing Status - LLE weight-bearing as tolerated   Weight-Bearing Status - RLE weight-bearing as tolerated   Range of Motion (ROM)   ROM Comment no cervical ROM   Strength (Manual Muscle Testing)   Strength Comments WFL   Bed Mobility   Bed Mobility supine-sit   Supine-Sit [place] (Bed Mobility) contact guard;verbal cues   Transfers   Transfers sit-stand transfer   Sit-Stand Transfer   Sit-Stand [place] (Transfers) contact guard;verbal cues   Assistive Device (Sit-Stand Transfers) walker, front-wheeled   Gait/Stairs (Locomotion)   [place] Level (Gait) contact guard;verbal cues   Assistive Device (Gait) walker, front-wheeled   Distance in Feet (Gait) 10'   Pattern (Gait) step-through   Deviations/Abnormal Patterns  (Gait) gait speed decreased;sandor decreased;stride length decreased   Clinical Impression   Criteria for Skilled Therapeutic Intervention Yes, treatment indicated   PT Diagnosis (PT) impaired functional mobility   Influenced by the following impairments pain, impaired balance, weakness   Functional limitations due to impairments gait, stairs, transfers   Clinical Presentation (PT Evaluation Complexity) stable   Clinical Presentation Rationale pt presents as medically diagnosed   Clinical Decision Making (Complexity) low complexity   Planned Therapy Interventions (PT) balance training;bed mobility training;gait training;home exercise program;patient/family education;ROM (range of motion);stair training;strengthening;transfer training   Risk & Benefits of therapy have been explained care plan/treatment goals reviewed;patient   PT Total Evaluation Time   PT Eval, Low Complexity Minutes (81628) 10   Physical Therapy Goals   PT Frequency 2x/day   PT Predicted Duration/Target Date for Goal Attainment 12/16/23   PT Goals PT Goal 1;Gait;Transfers;Stairs   PT: Transfers Modified independent;Sit to/from stand;Assistive device   PT: Gait Modified independent;Rolling walker;150 feet   PT: Stairs Supervision/stand-by assist;8 stairs;Rail on left;Assistive device   Interventions   Interventions Quick Adds Therapeutic Procedure;Therapeutic Activity;Gait Training   Therapeutic Activity   Therapeutic Activities: dynamic activities to improve functional performance Minutes (61877) 9   Treatment Detail/Skilled Intervention supine to sit with HOB elevated, cueing for LE advancement and PLB, sitting EOB x2 min, tolerates well, tearful due to pain - RN present to give meds, sit to/from stand, cueing for safe hand placement, CGA, reviewed spinal precautions and brace wearing instructions, educated on posture   Gait Training   Gait Training Minutes (63733) 17   Symptoms Noted During/After Treatment (Gait Training) fatigue;increased pain    Treatment Detail/Skilled Intervention steady, cueing for posture and FWW use, educated on safety, pt able to live on main level if she could obtain a commode, lives with 3 y/o daughter but states sister may be able to assist, STAIRS: 2 stairs up/down with rail, steady, cueing for technique, non-reciprocal patterning, CGA   Distance in Feet 200'   [place] Level (Gait Training) contact guard   Physical Assistance Level (Gait Training) supervision;verbal cues   Weight Bearing (Gait Training) weight-bearing as tolerated   Assistive Device (Gait Training) rolling walker   Pattern Analysis (Gait Training) swing-through gait   Gait Analysis Deviations decreased sandor;decreased stride length;decreased step length   PT Discharge Planning   PT Plan gait, HEP(shoulder circles and scap retr), stairs   PT Discharge Recommendation (DC Rec) home with assist   PT Rationale for DC Rec home with assist from sister as needed, recommend commode for home so pt doesn't need to negotiate stairs due to pain/fatigue   PT Brief overview of current status ambulating 200' with FWW, 2 stairs with rail, CGA   PT Equipment Needed at Discharge walker, rolling   Total Session Time   Timed Code Treatment Minutes 26   Total Session Time (sum of timed and untimed services) 36

## 2023-12-13 NOTE — PLAN OF CARE
Problem: Spinal Surgery  Goal: Optimal Coping with Surgery  Outcome: Progressing   Goal Outcome Evaluation:       Pain as high as 7/10, mainly posterior neck. Given Oxycodone 10mg approx 5 hours apart, reports fairly good relief from this. SHOBHA drained 7ml, so it was discontinued. Up in chair most of the shift, ambulating with gait belt, walker and one assist. No trouble swallowing.

## 2023-12-13 NOTE — PLAN OF CARE
"Goal Outcome Evaluation:    VSS on RA. Pt is alert and oriented. Calls appropriately for needs. Assist of 1-2 w/ gb and walker. Pt tolerating regular diet gave zofran for nausea, meds were effective. IV normal saline running at 125. Was unable to void post surgery, straight cath for 850. SHOBHA drain in place w/ bloody output. C/o 10/10 pain, give prn meds which \"only helped a little.\" Alarms on.   " DC instructions

## 2023-12-13 NOTE — PROGRESS NOTES
[facility] MEDICINE PROGRESS NOTE      Identification/Summary: Citlalli Tucker is a 48 year old female with a past medical history of MRSA infection years ago, history of DVT not on any recent anticoagulation and obesity who was admitted on 12/12/2023 for C4-7 anterior discectomy, fusion and plating. Hospital course is unremarkable.    Assessment and Plan:    48 year old female into [facility] for an elective C4-7 anterior   Discectomy, fusion and plating.  Pt had general anesthesia with minimal blood loss.       Problem list:      POD #1 C4-7 anterior discectomy, fusion, plating and currently doing well.  Plan: Per surgery  2.   History of dvt: prehospital she was not on anticoagulation              Team should consider this when planning for               Prophylaxis.  Currently has SCDs and ambulating  3.   Obesity.  Initial consulting hospitalist thought she might be at risk for obstructive sleep apnea but she denies that  4.  MRSA infection remotely.  She thinks she was tested and cleared for ongoing MRSA infection following that but not totally certain.:  Plan: Nasal swab today and then consulting hospitalist ordered mupirocin which can be started following that  5.  Mild to moderate eczema involving ankles and elbows.  Plan hydrocortisone 1% ointment 3 times daily and then can use it as needed in the future      Diet: Regular Diet Adult  Rojo Catheter: Not present  Lines: None       Code Status: Full Code    Disposition Per surgery team     The patient's care was discussed with the Bedside Nurse and Patient.    Tim Clarke MD  [facility]  [facility]  [facility]  Phone: #[phone]  Securely message with the Vocera Web Console (learn more here)  Text page via [employer] Paging/Directory     Interval History/Subjective:  Patient feels like she is overall doing well.  Currently pain is 7/10.  No nausea.  No chest pain or shortness of breath  or lightheadedness.  No other concerns or issues.  Has had chronic eczema for years did have good response to prescription topical steroid but has not been using anything recently and would appreciate a trial.    Physical Exam/Objective:  Temp:  [97.6  F (36.4  C)-99  F (37.2  C)] 97.6  F (36.4  C)  Pulse:  [] 72  Resp:  [11-18] 18  BP: (124-171)/(70-92) 124/75  SpO2:  [91 %-99 %] 97 %  Body mass index is 38.87 kg/m .    GENERAL:  Alert, appears comfortable, in no acute distress, appears stated age   HEAD:  Normocephalic, without obvious abnormality, atraumatic   EYES:  PERRL, conjunctiva/corneas clear, no scleral icterus, EOM's intact   LUNGS:   Clear to auscultation bilaterally, no rales, rhonchi, or wheezing, symmetric chest rise on inhalation, respirations unlabored   Neck:  Hard cervical collar in place   HEART:  Regular rate and rhythm, S1 and S2 normal, no murmur, rub, or gallop    ABDOMEN:   Soft, non-tender, no masses, no organomegaly, no rebound or guarding   EXTREMITIES: Extremities normal, atraumatic, no cyanosis or edema    SKIN: Dry thickened patches especially ankles left greater than right and mild on both elbows consistent with eczema   NEURO: Alert, oriented x3, moves all four extremities freely   PSYCH: Cooperative, behavior is appropriate      Data reviewed today: I personally reviewed all new medications, labs, imaging/diagnostics reports over the past 24 hours. Pertinent findings include:    Imaging:   Recent Results (from the past 24 hour(s))   XR Surgery BENIGNO  Fluoro G/T 5 Min    Narrative    This exam was marked as non-reportable because it will not be read by a   radiologist or a [place] non-radiologist provider.               Medications:   Personally Reviewed.  Medications    sodium chloride 125 mL/hr at 12/13/23 0246      acetaminophen  975 mg Oral Q8H    hydrocortisone   Topical TID    multivitamin, therapeutic  1 tablet Oral Daily    [Held by provider] mupirocin   Topical Daily     polyethylene glycol  17 g Oral Daily    senna-docusate  1 tablet Oral BID

## 2023-12-13 NOTE — CONSULTS
Care Management Initial Consult    General Information  Assessment completed with: Patient,    Type of CM/SW Visit: Initial Assessment    Primary Care Provider verified and updated as needed: Yes   Readmission within the last 30 days: no previous admission in last 30 days      Reason for Consult: discharge planning  Advance Care Planning:            Communication Assessment  Patient's communication style: spoken language (English or Bilingual)    Hearing Difficulty or Deaf: no   Wear Glasses or Blind: no    Cognitive  Cognitive/Neuro/Behavioral: WDL     Arousal Level: opens eyes spontaneously                Living Environment:   People in home: child(clover), dependent     Current living Arrangements: house      Able to return to prior arrangements: yes       Family/Social Support:  Care provided by: self  Provides care for: child(clover)  Marital Status: Single  Sibling(s)          Description of Support System: Supportive         Current Resources:   Patient receiving home care services: No     Community Resources: None  Equipment currently used at home: cane, straight  Supplies currently used at home: None    Employment/Financial:  Employment Status: other (see comments)        Financial Concerns: none   Referral to Financial Worker: No       Does the patient's insurance plan have a 3 day qualifying hospital stay waiver?  No    Lifestyle & Psychosocial Needs:  Social Determinants of Health     Food Insecurity: Not on file   Depression: Not on file   Housing Stability: Not on file   Tobacco Use: Medium Risk (12/13/2023)    Patient History     Smoking Tobacco Use: Unknown     Smokeless Tobacco Use: Former     Passive Exposure: Not on file   Financial Resource Strain: Not on file   Alcohol Use: Not on file   Transportation Needs: Not on file   Physical Activity: Not on file   Interpersonal Safety: Not on file   Stress: Not on file   Social Connections: Not on file       Functional Status:  Prior to admission patient  needed assistance:   Dependent ADLs:: Independent  Dependent IADLs:: Independent       Mental Health Status:  Mental Health Status: No Current Concerns       Chemical Dependency Status:  Chemical Dependency Status: No Current Concerns             Values/Beliefs:  Spiritual, Cultural Beliefs, Confucianist Practices, Values that affect care: no               Additional Information:  [facility] met and introduced self and CM services to Pt. 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  Pt usually independent at baseline.  Ortho thinks Pt may need Home PT, OT, RN and HHA.  Pt has no preference for Home Care.  Referral sent to [facility].  Family to transport.     OSCAR Milian

## 2023-12-13 NOTE — PROGRESS NOTES
[facility] 3E 3[address] MIGUEL ANGEL CHILDRESS patient's BP is 178/94 and HR 75  [id number]

## 2023-12-13 NOTE — PROGRESS NOTES
S: Pt is a 48 yof seen at [facility], room 3104, for evaluation and delivery of a [place] J cervical collar. Referring physician is Dr. Nath.  O: 5 4 . 230 lbs. C4-C7 fusion.  A: A [place] J size  cervical collar was fit to the pt to support the surgical site and provide cervical stability. Pt was satisfied with fit and function of device. Pt took delivery of cervical collar and written instructions. Donning/doffing and wear/care of orthosis were discussed with the pt.  P: Pt to follow-up as needed. The [place] J is to be worn according to physician's instructions.  G: Provide cervical stability with the cervical collar.    Electronically signed by Li Villafana CPO

## 2023-12-13 NOTE — PROGRESS NOTES
"Orthopedic Progress Note      Assessment: 1 Day Post-Op  S/P Procedure(s):  CERVICAL 4 - CERVICAL 7 ANTERIOR DISCECTOMY, FUSION, PLATING @    Plan:   -Continue PT/OT.   -Weightbearing status: No lifting greater than a full gallon of milk x6 weeks. WBAT bilateral lower extremities.  - [place] J collar x6 weeks. Ok to remove this for hygiene or when resting in bed.   -DVT prophylaxis with SCDs, omi stockings and early ambulation.  -Anticoagulation: None due to Spine surgery.   -NO aspirin, anticoagulation or NSAIDS (advil/ibuprofen, aleve/naproxen, celebrex) for 5 days after surgery.  -Encourage IS.  -Appreciate hospitalist recs.  - Drain can be pulled when 15cc or less on 2 shifts  -Discharge planning: discharge pending drain, pain      Subjective:  Pain: Well controlled on Tylenol, oxy, atarax and devin  Nausea, Vomiting:  no  Chest pain: No  Lightheadedness, Dizziness:  no  Neuro:  Patient denies new onset numbness or paresthesias in bilateral upper extremities     Patient doing very well on POD #1. Her pre-op Left arm pain and tingling have resolved and she's happy with her outcome following surgery. Oral pain meds controlling surgical pain. Voiding. Tolerating oral intake including oatmeal without issue. Ambulating in hallways.      Objective:  /67   Pulse (!) 46   Temp 97.4  F (36.3  C) (Oral)   Resp 16   Ht 1.676 m (5' 6\")   Wt 139.7 kg (308 lb)   LMP  (LMP Unknown)   SpO2 96%   BMI 49.71 kg/m    The patient is A&Ox3. Appears comfortable.   Sensation is intact to light touch in bilateral upper extremities in C5, C6, C7 & C8 distributions  Motor: +5/5 elbow flexion, elbow extension, wrist flexion, wrist extension bilaterally. Equal  strength bilaterally. Full composite fist bilaterally. Opposes thumb.  Palpable Left radial pulse  Calves are soft and non-tender bilaterally. Negative Luba's.  Anterior neck incision is c/d/i with steri's in place, no hematoma     Pertinent Labs   Lab Results: " "personally reviewed.   No results found for: \"INR\", \"PROTIME\"  Lab Results   Component Value Date    HGB 11.2 (L) 12/13/2023     No results found for: \"NA\", \"CO2\"      Report completed by:  Yudith Pimentel PA-C/Dr. [facility]    Date: 12/13/2023  Time: 11:35 AM    "

## 2023-12-14 ENCOUNTER — APPOINTMENT (OUTPATIENT)
Dept: PHYSICAL THERAPY | Facility: CLINIC | Age: 48
End: 2023-12-14
Attending: ORTHOPAEDIC SURGERY
Payer: COMMERCIAL

## 2023-12-14 ENCOUNTER — APPOINTMENT (OUTPATIENT)
Dept: OCCUPATIONAL THERAPY | Facility: CLINIC | Age: 48
End: 2023-12-14
Attending: ORTHOPAEDIC SURGERY
Payer: COMMERCIAL

## 2023-12-14 PROBLEM — Z86.718 HISTORY OF DEEP VENOUS THROMBOSIS: Status: ACTIVE | Noted: 2023-12-14

## 2023-12-14 PROBLEM — I10 HYPERTENSION: Status: ACTIVE | Noted: 2023-12-14

## 2023-12-14 PROBLEM — F32.A DEPRESSIVE DISORDER: Status: ACTIVE | Noted: 2023-12-14

## 2023-12-14 PROCEDURE — 97116 GAIT TRAINING THERAPY: CPT | Mod: GP

## 2023-12-14 PROCEDURE — 250N000013 HC RX MED GY IP 250 OP 250 PS 637: Performed by: FAMILY MEDICINE

## 2023-12-14 PROCEDURE — 97535 SELF CARE MNGMENT TRAINING: CPT | Mod: GO

## 2023-12-14 PROCEDURE — 99232 SBSQ HOSP IP/OBS MODERATE 35: CPT | Performed by: FAMILY MEDICINE

## 2023-12-14 PROCEDURE — 250N000013 HC RX MED GY IP 250 OP 250 PS 637: Performed by: PHYSICIAN ASSISTANT

## 2023-12-14 PROCEDURE — 120N000001 HC R&B MED SURG/OB

## 2023-12-14 PROCEDURE — 250N000011 HC RX IP 250 OP 636: Performed by: PHYSICIAN ASSISTANT

## 2023-12-14 RX ORDER — ESCITALOPRAM OXALATE 10 MG/1
10 TABLET ORAL DAILY
Status: DISCONTINUED | OUTPATIENT
Start: 2023-12-14 | End: 2023-12-15 | Stop reason: HOSPADM

## 2023-12-14 RX ORDER — HYDROMORPHONE HYDROCHLORIDE 2 MG/1
2-4 TABLET ORAL EVERY 6 HOURS PRN
Status: DISCONTINUED | OUTPATIENT
Start: 2023-12-14 | End: 2023-12-15 | Stop reason: HOSPADM

## 2023-12-14 RX ADMIN — METHOCARBAMOL TABLETS 750 MG: 750 TABLET, COATED ORAL at 18:29

## 2023-12-14 RX ADMIN — METOPROLOL TARTRATE 12.5 MG: 25 TABLET, FILM COATED ORAL at 20:15

## 2023-12-14 RX ADMIN — SENNOSIDES AND DOCUSATE SODIUM 1 TABLET: 8.6; 5 TABLET ORAL at 20:15

## 2023-12-14 RX ADMIN — HYDROCORTISONE: 1 OINTMENT TOPICAL at 14:57

## 2023-12-14 RX ADMIN — PROCHLORPERAZINE EDISYLATE 10 MG: 5 INJECTION INTRAMUSCULAR; INTRAVENOUS at 15:15

## 2023-12-14 RX ADMIN — ACETAMINOPHEN 975 MG: 325 TABLET ORAL at 01:33

## 2023-12-14 RX ADMIN — SENNOSIDES AND DOCUSATE SODIUM 1 TABLET: 8.6; 5 TABLET ORAL at 08:00

## 2023-12-14 RX ADMIN — ACETAMINOPHEN 975 MG: 325 TABLET ORAL at 09:51

## 2023-12-14 RX ADMIN — ESCITALOPRAM OXALATE 10 MG: 10 TABLET ORAL at 11:46

## 2023-12-14 RX ADMIN — OXYCODONE HYDROCHLORIDE 10 MG: 5 TABLET ORAL at 01:33

## 2023-12-14 RX ADMIN — ACETAMINOPHEN 975 MG: 325 TABLET ORAL at 18:24

## 2023-12-14 RX ADMIN — ONDANSETRON 4 MG: 4 TABLET, ORALLY DISINTEGRATING ORAL at 12:36

## 2023-12-14 RX ADMIN — METOPROLOL TARTRATE 12.5 MG: 25 TABLET, FILM COATED ORAL at 11:46

## 2023-12-14 RX ADMIN — THERA TABS 1 TABLET: TAB at 08:00

## 2023-12-14 RX ADMIN — POLYETHYLENE GLYCOL 3350 17 G: 17 POWDER, FOR SOLUTION ORAL at 07:58

## 2023-12-14 RX ADMIN — OXYCODONE HYDROCHLORIDE 10 MG: 5 TABLET ORAL at 07:52

## 2023-12-14 RX ADMIN — HYDROCORTISONE: 1 OINTMENT TOPICAL at 08:00

## 2023-12-14 RX ADMIN — METHOCARBAMOL TABLETS 750 MG: 750 TABLET, COATED ORAL at 07:52

## 2023-12-14 ASSESSMENT — ACTIVITIES OF DAILY LIVING (ADL)
ADLS_ACUITY_SCORE: 24

## 2023-12-14 NOTE — DISCHARGE INSTRUCTIONS
Home care services have been arranged for you:  Home Care Agency: [facility]   Home Care Services: RN, PT, OT and HHA   Home Care Phone: [phone]    Instructions: Home care agency will call within 48 hours of discharge to schedule appointments.

## 2023-12-14 NOTE — PLAN OF CARE
Patient vital signs are at baseline: No,  Reason:  BP elevated today.   Patient able to ambulate as they were prior to admission or with assist devices provided by therapies during their stay:  Yes  Patient MUST void prior to discharge:  Yes  Voided and passed 3 PVRS  Patient able to tolerate oral intake:  Yes  Pain has adequate pain control using Oral analgesics:  Yes  Does patient have an identified :  Yes  Has goal D/C date and time been discussed with patient:  Yes    [facility] 12-14-23

## 2023-12-14 NOTE — PROGRESS NOTES
Care Management Follow Up    Length of Stay (days): 2    Expected Discharge Date: 12/15/2023     Concerns to be Addressed: discharge planning     Patient plan of care discussed at interdisciplinary rounds: Yes    Anticipated Discharge Disposition: Home, Home Care     Anticipated Discharge Services: None  Anticipated Discharge DME: None    Patient/family educated on Medicare website which has current facility and service quality ratings: yes  Education Provided on the Discharge Plan: Yes  Patient/Family in Agreement with the Plan: yes    Referrals Placed by CM/SW: Homecare  Private pay costs discussed: Not applicable    Additional Information:    [facility] [phone] accept Pt for PT, OT, HHA and RN.  Can see Pt tomorrow if Pt discharged home today    2:47 PM  MARK met with Pt and states that she had anxiety when doing the stairs. [facility] asked Pt's permission to call her sister and gave approval.  [facility] called and spoke with Pt's sister Betty.  Betty lives close to Pt and will be able to check on her.  Pt's bathroom is on the upper level.  OT recommended a commode.  Betty and Pt concerned about emptying and possibility of her daughter getting into it.  Pt is hoping that when she is home that stairs will go better. Betty states that Pt has 2 forms that need to be completed for the [place] that need to be signed by a MD that states that Pt cannot work at this time so she can continue with benefits. MARK talked with [facility] and states that Pt will need to bring forms to her follow up appointment for Portillo [facility]ball to complete.      EDUARDO called [facility] and let them know that Pt will be staying another night. Sister to transport when Pt medically ready for discharge.      OSCAR Milian

## 2023-12-14 NOTE — PROGRESS NOTES
[facility] MEDICINE  PROGRESS NOTE     Code Status: Full Code  Procedure(s):  CERVICAL 4 - CERVICAL 7 ANTERIOR DISCECTOMY, FUSION, PLATING  2 Days Post-Op  Identification/Summary:   Citlalli Tucker is a 48 year old female with a PMH of HTN, depression, DVT, MRSA, eczema.  12/12/2023 admitted for C4-7 anterior discectomy with fusion.  Postoperatively having some issues with blood pressure and will start metoprolol.  Also has been quite tearful.  Had been on Lexapro in the past to good effect.  Agreeable to resuming.  Otherwise medically stable.    Assessment and Plan:    Status post C4-7 anterior discectomy, fusion, plating  Postoperative management per surgery.   Anxiety and depression  Patient has frequently been quite tearful over the course of her stay.  In the past had been on Lexapro to good effect.  Agreeable to resuming.  Ordered Lexapro 10 mg daily.  Should continue at discharge and follow-up with primary care provider.  Essential hypertension  Blood pressure consistently elevated pre and postoperatively.  After discussion with patient agreeable to starting metoprolol 12.5 mg p.o. twice daily with hold parameters.  Monitor response.  History of MRSA  Utilize contact precautions.  12/13 MRSA nasal swab was negative.  Appreciate infection control recommendations.  Patient will need a second MRSA swab that can be obtained after 12/20/2023.  If this is also negative then would not need contact precautions going forward.  Should pursue this as an outpatient.  Eczema  Utilize hydrocortisone ointment as needed.    Anticoagulation   History of DVT  SCDs and ambulation per surgery.    COVID-19 PCR not tested    Fluids: Saline lock  Pain meds: Per surgery  Therapy: Per surgery  Rojo:Not present  Lines: None       Current Diet  Orders Placed This Encounter      Regular Diet Adult    Supplements  None    Barriers to Discharge: Pain control    Disposition: Medically  "stable    Clinically Significant Risk Factors                         # Obesity: Estimated body mass index is 38.87 kg/m  as calculated from the following:    Height as of this encounter: 1.638 m (5' 4.5\").    Weight as of this encounter: 104.3 kg (230 lb)., PRESENT ON ADMISSION     # Financial/Environmental Concerns: none         Interval History/Subjective:  Patient has been tearful intermittently during her hospital stay.  Mother recently passed away.  Patient had been on Lexapro in the past to good effect.  Would be agreeable to starting that again.  Patient also has been hypertensive.  Has been on blood pressure meds in the past but uncertain which ones.  At this time no chest pain.  No shortness of breath.  No nausea or vomiting.  Questions answered to verbalized satisfaction.      Last 24H PRN:      hydrALAZINE (APRESOLINE) tablet 10 mg, 10 mg at 12/13/23 1759     methocarbamol (ROBAXIN) tablet 750 mg, 750 mg at 12/14/23 0752     oxyCODONE (ROXICODONE) tablet 5 mg, 5 mg at 12/13/23 2112 **OR** oxyCODONE (ROXICODONE) tablet 10 mg, 10 mg at 12/14/23 0752    Physical Exam/Objective:  Temp:  [97.5  F (36.4  C)-97.9  F (36.6  C)] 97.9  F (36.6  C)  Pulse:  [69-76] 69  Resp:  [16-18] 18  BP: (140-178)/(74-94) 166/89  SpO2:  [90 %-100 %] 99 %  Wt Readings from Last 4 Encounters:   12/12/23 104.3 kg (230 lb)     Body mass index is 38.87 kg/m .    Constitutional: awake, alert, cooperative, no apparent distress, and appears stated age  ENT: Limited exam secondary to cervical collar.  Respiratory: No increased work of breathing, good air exchange, clear to auscultation bilaterally, no crackles or wheezing  Cardiovascular: Normal apical impulse, regular rate and rhythm, normal S1 and S2, no S3 or S4, and no murmur noted  GI: No scars, normal bowel sounds, soft, non-distended, non-tender, no masses palpated, no hepatosplenomegally  Skin: normal skin color, texture, turgor, no redness, warmth, or swelling, and no " rashes  Musculoskeletal: There is no redness, warmth, or swelling of the joints.  Motor strength is 5 out of 5 all extremities bilaterally.  Tone is normal. no lower extremity pitting edema present  Neurologic: Cranial nerves II-XII are grossly intact. Sensory:  Sensory intact  Neuropsychiatric: General: normal, calm, and normal eye contact Level of consciousness: alert / normal Affect: tearful and sad Orientation: oriented to self, place, time and situation Memory and insight: normal, memory for past and recent events intact, and thought process normal      Medications:   Personally Reviewed.  Medications     sodium chloride Stopped (12/13/23 1051)       acetaminophen  975 mg Oral Q8H     hydrocortisone   Topical TID     multivitamin, therapeutic  1 tablet Oral Daily     [Held by provider] mupirocin   Topical Daily     polyethylene glycol  17 g Oral Daily     senna-docusate  1 tablet Oral BID       Data reviewed today: I personally reviewed all new medications, labs, imaging/diagnostics reports over the past 24 hours. Pertinent findings include:    Imaging:   No results found for this or any previous visit (from the past 24 hour(s)).    Labs:  XR Surgery BENIGNO  Fluoro G/T 5 Min   Final Result      XR Cervical Spine Port 2/3 Views    (Results Pending)     No results found for this or any previous visit (from the past 24 hour(s)).    Pending Labs:  Unresulted Labs Ordered in the Past 30 Days of this Admission       No orders found from 11/12/2023 to 12/13/2023.              Brian Delcid MD  [facility] Medicine  [facility]  Phone: #[phone]

## 2023-12-14 NOTE — PROGRESS NOTES
Occupational Therapy Discharge Summary    Reason for therapy discharge:    Discharged to home.    Progress towards therapy goal(s). See goals on Care Plan in [facility] electronic health record for goal details.  Goals met    Therapy recommendation(s):    No further therapy is recommended.

## 2023-12-14 NOTE — PROGRESS NOTES
Per MDRO discontinuation guidance, this patient's MRSA infection flag cannot be discontinued at this time. One negative MRSA nares result was found after initial wound infection in [facility] wound culture in 2017. Our current guidance requires two consecutive negative nares MRSA surveillance results, collected one week apart to discontinue the infection flag/isolation for future care encounters. RN on [facility] informed this note would be entered after IP chart review.     For stewardship: if the patient is in our care on 12/20/23, we could collect a second MRSA nares swab. Upon discharge, the patient could be educated and informed that a MRSA nares colonization test could be ordered by primary care provider any time after discharge as long as she is not taking antibiotics effective against MRSA.     .Infection Prevention Progress Note  12/14/2023      Patient Name: Citlalli Tucker [id number]  Admit Date: 12/12/2023    Infection Status as of 12/14/2023 9:49 AM: No active infections  Isolation Status as of 12/14/2023 9:49 AM: Contact     MDRO Discontinuation  Infection Prevention has reviewed this patient's chart per the MDRO D/C Policy and have taken the following action:      The patient does not qualify for discontinuation as patient does not have the required negative results. When non-qualifying reason(s) no longer apply, please contact Infection Prevention for re-evaluation.      Patient requires one more consecutive negative cultures from nares prior to continuing discontinuation evaluation. Surveillance culture orders placed, date: an order can be placed 12/20/23 if the patient is still in our care and not taking antibiotics effective against MRSA at that time nor one week prior to swab collection.     Contact Precautions ordered for the following MDRO(s): MRSA    If you have any questions, please contact Infection Prevention.    Danii Portillo, Infection Prevention.9:53 AM  .12/14/2023

## 2023-12-14 NOTE — PHARMACY-CONSULT NOTE
Pharmacy Consult to Determine Previous Antidepressant Medication Taken by Patient:      Patient filled Escitalopram 10 mg daily at [facility] 10/4/2022.       Sigifredo Bartlett RP, PharmD, BCPS 12/14/2023 10:52 AM

## 2023-12-14 NOTE — PROGRESS NOTES
"Orthopedic Progress Note      Assessment: 2 Day Post-Op  S/P Procedure(s):  CERVICAL 4 - CERVICAL 7 ANTERIOR DISCECTOMY, FUSION, PLATING @    Plan:   -Continue PT/OT.   -Weightbearing status: No lifting greater than a full gallon of milk x6 weeks. WBAT bilateral lower extremities.  - [place] J collar x6 weeks. Ok to remove this for hygiene or when resting in bed.   -DVT prophylaxis with SCDs, omi stockings and early ambulation.  -Anticoagulation: None due to Spine surgery.   -NO aspirin, anticoagulation or NSAIDS (advil/ibuprofen, aleve/naproxen, celebrex) for 5 days after surgery.  -Encourage IS.  -Appreciate hospitalist recs.  -Discharge planning: discharge pending social plan, has 3 year old daughter that she is sole caregiver of     Subjective:  Pain: Well controlled on Tylenol, oxy, atarax and devin  Nausea, Vomiting:  no  Chest pain: No  Lightheadedness, Dizziness:  no  Neuro:  Patient denies new onset numbness or paresthesias in bilateral upper extremities     Patient doing very well on POD #2. Her pre-op Left arm pain and tingling have resolved and she's happy with her outcome following surgery. Oral pain meds controlling surgical pain. Voiding. Tolerating oral intake including oatmeal without issue. Ambulating in hallways.      Objective:  /67   Pulse (!) 46   Temp 97.4  F (36.3  C) (Oral)   Resp 16   Ht 1.676 m (5' 6\")   Wt 139.7 kg (308 lb)   LMP  (LMP Unknown)   SpO2 96%   BMI 49.71 kg/m    The patient is A&Ox3. Appears comfortable.   Sensation is intact to light touch in bilateral upper extremities in C5, C6, C7 & C8 distributions  Motor: +5/5 elbow flexion, elbow extension, wrist flexion, wrist extension bilaterally. Equal  strength bilaterally. Full composite fist bilaterally. Opposes thumb.  Palpable Left radial pulse  Calves are soft and non-tender bilaterally. Negative Luba's.  Anterior neck incision is c/d/i with steri's in place, no hematoma     Pertinent Labs   Lab Results: " "personally reviewed.   No results found for: \"INR\", \"PROTIME\"  Lab Results   Component Value Date    HGB 11.2 (L) 12/13/2023     No results found for: \"NA\", \"CO2\"      Report completed by:  Yudith Pimentel PA-C/Dr. [facility]    12/14/23     "

## 2023-12-14 NOTE — PLAN OF CARE
Goal Outcome Evaluation:    Problem: Adult Inpatient Plan of Care  Goal: Optimal Comfort and Wellbeing  Intervention: Monitor Pain and Promote Comfort  Problem: Pain Acute  Goal: Optimal Pain Control and Function  Intervention: Develop Pain Management Plan  Intervention: Prevent or Manage Pain  Patient vital signs are at baseline: Yes  Patient able to ambulate as they were prior to admission or with assist devices provided by therapies during their stay:  Yes  Patient MUST void prior to discharge:  Yes  Patient able to tolerate oral intake:  Yes  Pain has adequate pain control using Oral analgesics:  Yes  Does patient have an identified :  Yes  Has goal D/C date and time been discussed with patient:  Yes     A&Ox 4. VSS, on RA. CMS intact. IV saline locked. Dressing with small dry drainage. Rated severe pain. Pain managed with scheduled and PRN oral pain medications. Cervical collar in place. Contact precautions maintained. Call light within reach and bed alarm activated for safety.

## 2023-12-14 NOTE — PLAN OF CARE
Goal Outcome Evaluation:    Problem: Adult Inpatient Plan of Care  Goal: Optimal Comfort and Wellbeing  Outcome: Progressing   Pt awoke with 10/10 pain in back of neck, and across both shoulders. She was given 10mg Oxycodone and 750mg Methocarbamol. At 8:00am, and reported good relief from this. Two new meds were started at the same time, at 11:45am; Metoprolol and Escitalopram. An hour later, she complained of nausea. Ondansetron ODT was ineffective. Prochlorperazine given with better relief. Patient states that her siblings get very nauseated with Oxycodone, and she is certain that is the cause. Dr Nath notified and new order for Hydromorphone PO instead. She has not tried any yet, and her pain is currently only 2/10. Will monitor for side effects, and for possible other causes of her nausea.

## 2023-12-15 ENCOUNTER — APPOINTMENT (OUTPATIENT)
Dept: PHYSICAL THERAPY | Facility: CLINIC | Age: 48
End: 2023-12-15
Attending: ORTHOPAEDIC SURGERY
Payer: COMMERCIAL

## 2023-12-15 VITALS
WEIGHT: 230 LBS | RESPIRATION RATE: 18 BRPM | HEART RATE: 75 BPM | SYSTOLIC BLOOD PRESSURE: 177 MMHG | HEIGHT: 65 IN | OXYGEN SATURATION: 96 % | TEMPERATURE: 97.9 F | BODY MASS INDEX: 38.32 KG/M2 | DIASTOLIC BLOOD PRESSURE: 77 MMHG

## 2023-12-15 PROCEDURE — 99232 SBSQ HOSP IP/OBS MODERATE 35: CPT | Performed by: FAMILY MEDICINE

## 2023-12-15 PROCEDURE — 250N000013 HC RX MED GY IP 250 OP 250 PS 637: Performed by: FAMILY MEDICINE

## 2023-12-15 PROCEDURE — 250N000013 HC RX MED GY IP 250 OP 250 PS 637: Performed by: PHYSICIAN ASSISTANT

## 2023-12-15 PROCEDURE — 97116 GAIT TRAINING THERAPY: CPT | Mod: GP

## 2023-12-15 RX ORDER — ESCITALOPRAM OXALATE 10 MG/1
10 TABLET ORAL DAILY
Qty: 30 TABLET | Refills: 0 | Status: SHIPPED | OUTPATIENT
Start: 2023-12-15

## 2023-12-15 RX ORDER — ACETAMINOPHEN 325 MG/1
650 TABLET ORAL EVERY 4 HOURS PRN
Qty: 100 TABLET | Refills: 0 | Status: SHIPPED | OUTPATIENT
Start: 2023-12-15

## 2023-12-15 RX ORDER — METHOCARBAMOL 750 MG/1
750 TABLET, FILM COATED ORAL EVERY 6 HOURS PRN
Qty: 60 TABLET | Refills: 0 | Status: SHIPPED | OUTPATIENT
Start: 2023-12-15

## 2023-12-15 RX ORDER — HYDROMORPHONE HYDROCHLORIDE 2 MG/1
2-4 TABLET ORAL EVERY 6 HOURS PRN
Qty: 20 TABLET | Refills: 0 | Status: SHIPPED | OUTPATIENT
Start: 2023-12-15

## 2023-12-15 RX ORDER — DIAPER,BRIEF,INFANT-TODD,DISP
EACH MISCELLANEOUS 3 TIMES DAILY
Qty: 50 G | Refills: 0 | Status: SHIPPED | OUTPATIENT
Start: 2023-12-15

## 2023-12-15 RX ORDER — LISINOPRIL 10 MG/1
10 TABLET ORAL DAILY
Qty: 30 TABLET | Refills: 0 | Status: SHIPPED | OUTPATIENT
Start: 2023-12-15

## 2023-12-15 RX ORDER — METOPROLOL TARTRATE 25 MG/1
25 TABLET, FILM COATED ORAL 2 TIMES DAILY
Status: DISCONTINUED | OUTPATIENT
Start: 2023-12-15 | End: 2023-12-15

## 2023-12-15 RX ADMIN — HYDROCORTISONE: 1 OINTMENT TOPICAL at 08:02

## 2023-12-15 RX ADMIN — ACETAMINOPHEN 975 MG: 325 TABLET ORAL at 01:58

## 2023-12-15 RX ADMIN — METOPROLOL TARTRATE 12.5 MG: 25 TABLET, FILM COATED ORAL at 08:02

## 2023-12-15 RX ADMIN — ESCITALOPRAM OXALATE 10 MG: 10 TABLET ORAL at 13:25

## 2023-12-15 RX ADMIN — METOPROLOL TARTRATE 12.5 MG: 25 TABLET, FILM COATED ORAL at 10:11

## 2023-12-15 RX ADMIN — HYDROCORTISONE: 1 OINTMENT TOPICAL at 13:25

## 2023-12-15 RX ADMIN — THERA TABS 1 TABLET: TAB at 08:02

## 2023-12-15 RX ADMIN — ACETAMINOPHEN 975 MG: 325 TABLET ORAL at 10:11

## 2023-12-15 ASSESSMENT — ACTIVITIES OF DAILY LIVING (ADL)
ADLS_ACUITY_SCORE: 24

## 2023-12-15 NOTE — DISCHARGE SUMMARY
ORTHOPEDIC DISCHARGE SUMMARY       Citlalli Tucker,  1975, MRN [id number]    Admission Date: 2023      Admission Diagnoses: Degeneration of intervertebral disc of cervical region [M50.30]  Spinal stenosis [M48.00]  Cervical spinal stenosis [M48.02]     Discharge Date:  12/15/2023    Post-operative Day:  3 Days Post-Op    Reason for Admission: The patient was admitted for the following: Procedure(s):  CERVICAL 4 - CERVICAL 7 ANTERIOR DISCECTOMY, FUSION, PLATING    BRIEF HOSPITAL COURSE   Citlalli Tucker is a pleasant 48 year old female who underwent the aforementioned procedure with Dr. Nath on 2023. There were no intraoperative complications and the patient was transferred to the recovery room and later the orthopedic unit in stable condition. Once the patient reached the orthopedic floor our orthopedic pain protocol was implemented along with the following:    Anticoagulation Medications: None  Therapy: PT and OT  Activity: WBAT with no lifting no greater than a full gallon of milk x6 weeks  Bracing:  [place] J collar for 6 weeks. Ok to remove for hygiene or resting in bed.    Consultations during Admission: Hospitalist service for medical management     COMPLICATIONS/SIGNIFICANT FINDINGS    None.    DISCHARGE INFORMATION   Condition at discharge: Good  Discharge destination: Home with home cares  Patient was seen by myself on the date of discharge.    FOLLOW UP CARE   Follow up with orthopedics in 6 weeks or sooner should the need arise. Ortho will continue to manage pain control, post op anticoagulation and incision care.     Follow up with your PCP for management of chronic medical problems and to evaluate for post op medical complications including constipation, nausea/vomiting, DVT/PE, anemia, changes in blood pressure, fevers/chills, urinary retention and atelectasis/pneumonia.     Subjective   Patient is doing well on POD #1. Pain is well controlled with oral medications.  "Ambulating well. Tolerating oral intake. Voiding without issue. Swallowing without pain or difficulty.     Physical Exam   BP (!) 177/77 (BP Location: Right arm)   Pulse 75   Temp 97.9  F (36.6  C) (Oral)   Resp 18   Ht 1.638 m (5' 4.5\")   Wt 104.3 kg (230 lb)   SpO2 96%   BMI 38.87 kg/m    The patient is A&Ox3. Appears comfortable.   Sensation is intact to light touch in bilateral upper extremities in C5, C6, C7 & C8 distributions  Motor: +5/5 elbow flexion, elbow extension, wrist flexion, wrist extension bilaterally. Equal  strength bilaterally. Full composite fist bilaterally. Opposes thumb.  Palpable Left radial pulse  Calves are soft and non-tender bilaterally. Negative Luba's.  Anterior neck incision is c/d/i with steri's in place, no hematoma     Pertinent Results at Discharge     Hemoglobin   Date/Time Value Ref Range Status   12/13/2023 09:51 AM 11.2 (L) 11.7 - 15.7 g/dL Final       Problem List   Principal Problem:    Cervical spinal stenosis  Active Problems:    Hypertension    Depressive disorder    History of deep venous thrombosis      Tash Ernandez PA-C/Dr. Nath  [facility]  [phone]  Date: 12/15/2023  Time: 12:35 PM   "

## 2023-12-15 NOTE — PROGRESS NOTES
Care Management Discharge Note    Discharge Date: 12/15/2023       Discharge Disposition: Home, Home Care    Discharge Services: RN PT OT HHA    Discharge DME: None    Discharge Transportation: family or friend will provide    Private pay costs discussed: Not applicable    Does the patient's insurance plan have a 3 day qualifying hospital stay waiver?  No    PAS Confirmation Code:    Patient/family educated on Medicare website which has current facility and service quality ratings: yes    Education Provided on the Discharge Plan: Yes  Persons Notified of Discharge Plans: yes  Patient/Family in Agreement with the Plan: yes    Handoff Referral Completed: Yes    Additional Information:  SW met with pt to discuss, and confirm discharge plan to home with home care services; RN PT OT HHA. All parties aware, and agreeable to discharge plan. [facility] intake notified of discharge. No other needs from  at this time. Family to transport.  12:54 PM    ARELIS Felix  12/15/2023

## 2023-12-15 NOTE — PLAN OF CARE
Patient vital signs are at baseline: Yes  Patient able to ambulate as they were prior to admission or with assist devices provided by therapies during their stay:  Yes  Patient MUST void prior to discharge:  Yes  Patient able to tolerate oral intake:  Yes  Pain has adequate pain control using Oral analgesics:  Yes  Does patient have an identified :  Yes  Has goal D/C date and time been discussed with patient:  Yes    A/O x 4. VSS on RA. CMS intact. Denies N/V/SOB. Pain managed with scheduled medications. Coffee J collar in place.      Goal Outcome Evaluation:    Problem: Adult Inpatient Plan of Care  Goal: Absence of Hospital-Acquired Illness or Injury  Intervention: Prevent Infection  Recent Flowsheet Documentation  Taken 12/15/2023 0329 by Stella Grier RN  Infection Prevention:   hand hygiene promoted   personal protective equipment utilized   rest/sleep promoted   single patient room provided  Taken 12/14/2023 2015 by Stella Grier RN  Infection Prevention:   hand hygiene promoted   personal protective equipment utilized   rest/sleep promoted   single patient room provided     Problem: Adult Inpatient Plan of Care  Goal: Optimal Comfort and Wellbeing  Outcome: Progressing     Problem: Pain Acute  Goal: Optimal Pain Control and Function  Outcome: Progressing  Intervention: Prevent or Manage Pain  Recent Flowsheet Documentation  Taken 12/15/2023 0329 by Stella Grier RN  Medication Review/Management: medications reviewed  Taken 12/14/2023 2015 by Stella Grier RN  Medication Review/Management: medications reviewed  Intervention: Optimize Psychosocial Wellbeing  Recent Flowsheet Documentation  Taken 12/15/2023 0329 by Stella Grier RN  Supportive Measures: active listening utilized  Taken 12/14/2023 2015 by Stella Grier RN  Supportive Measures: active listening utilized

## 2023-12-15 NOTE — PROGRESS NOTES
"Orthopedic Progress Note      Assessment: 3 Day Post-Op  S/P Procedure(s):  CERVICAL 4 - CERVICAL 7 ANTERIOR DISCECTOMY, FUSION, PLATING @    Plan:   -Continue PT/OT.   -Weightbearing status: No lifting greater than a full gallon of milk x6 weeks. WBAT bilateral lower extremities.  - [place] J collar x6 weeks. Ok to remove this for hygiene or when resting in bed.   -DVT prophylaxis with SCDs, omi stockings and early ambulation.  -Anticoagulation: None due to Spine surgery.   -NO aspirin, anticoagulation or NSAIDS (advil/ibuprofen, aleve/naproxen, celebrex) for 5 days after surgery.  -Encourage IS.  -Appreciate hospitalist recs.  -Discharge planning: home today if medically clear     Subjective:  Pain: Well controlled on Tylenol, oxy, atarax and devin  Nausea, Vomiting:  no  Chest pain: No  Lightheadedness, Dizziness:  no  Neuro:  Patient denies new onset numbness or paresthesias in bilateral upper extremities     Patient doing very well on POD #3. Oral pain meds controlling surgical pain. Voiding. Tolerating oral intake including oatmeal without issue. Ambulating in hallways.      Objective:  /67   Pulse (!) 46   Temp 97.4  F (36.3  C) (Oral)   Resp 16   Ht 1.676 m (5' 6\")   Wt 139.7 kg (308 lb)   LMP  (LMP Unknown)   SpO2 96%   BMI 49.71 kg/m    The patient is A&Ox3. Appears comfortable.   Sensation is intact to light touch in bilateral upper extremities in C5, C6, C7 & C8 distributions  Motor: +5/5 elbow flexion, elbow extension, wrist flexion, wrist extension bilaterally. Equal  strength bilaterally. Full composite fist bilaterally. Opposes thumb.  Palpable Left radial pulse  Calves are soft and non-tender bilaterally. Negative Luba's.  Anterior neck incision is c/d/i with steri's in place, no hematoma     Pertinent Labs   Lab Results: personally reviewed.   No results found for: \"INR\", \"PROTIME\"  Lab Results   Component Value Date    HGB 11.2 (L) 12/13/2023     No results found for: \"NA\", " "\"CO2\"      Report completed by:  Yudith Pimentel PA-C/Dr. Nath  [facility]    12/15/23     "

## 2023-12-15 NOTE — PLAN OF CARE
Patient vital signs are at baseline: Yes, pt hypertensive, pt states she is at baseline, provider aware and made necessary changes.   Patient able to ambulate as they were prior to admission or with assist devices provided by therapies during their stay:  Yes  Patient MUST void prior to discharge:  Yes  Patient able to tolerate oral intake:  Yes  Pain has adequate pain control using Oral analgesics:  Yes  Does patient have an identified :  Yes  Has goal D/C date and time been discussed with patient:  Yes    Discharge instructions were reviewed in room by discharge RN, PIV removed. Waiting for sister Betty to transport pt home.

## 2023-12-15 NOTE — PROGRESS NOTES
[facility] MEDICINE  PROGRESS NOTE     Code Status: Full Code  Procedure(s):  CERVICAL 4 - CERVICAL 7 ANTERIOR DISCECTOMY, FUSION, PLATING  3 Days Post-Op  Identification/Summary:   Citlalli Tucker is a 48 year old female with a PMH of HTN, depression, DVT, MRSA, eczema.  12/12/2023 admitted for C4-7 anterior discectomy with fusion.  Postoperatively having issues with blood pressure and started metoprolol.  Blood pressures persistently elevated so at discharge will instead transition to lisinopril.   Also has been quite tearful.  Had been on Lexapro in the past to good effect.  Agreeable to resuming so prescription given.  Follow-up closely with primary care provider.  Medically stable for discharge by orthopedics.     Assessment and Plan:     Status post C4-7 anterior discectomy, fusion, plating  Postoperative management per surgery.   Anxiety and depression  Patient has frequently been quite tearful over the course of her stay.  In the past had been on Lexapro to good effect.  Agreeable to resuming.  Given Lexapro 10 mg daily.  Should continue at discharge and follow-up with primary care provider.  Essential hypertension  Blood pressure consistently elevated pre and postoperatively.  Given metoprolol 12.5 mg p.o. twice daily with hold parameters.    Unfortunately blood pressure remained persistently elevated despite increased dose of metoprolol 25 mg.  After discussion with patient will transition instead to lisinopril 10 mg daily.  Follow-up with primary care provider.  History of MRSA  Utilize contact precautions.  12/13 MRSA screening nasal swab was negative.  Appreciate infection control recommendations.  Patient will need a second MRSA swab that can be obtained after 12/20/2023.  If this is also negative then would not need contact precautions going forward.  Should pursue this as an outpatient.  Eczema  Utilize hydrocortisone ointment as needed.     Anticoagulation  "  History of DVT  SCDs and ambulation per surgery.     COVID-19 PCR not tested     Fluids: Saline lock  Pain meds: Per surgery  Therapy: Per surgery  Rojo:Not present  Lines: None       Current Diet  Orders Placed This Encounter      Regular Diet Adult      Discharge Instruction - Regular Diet Adult    Supplements  None    Barriers to Discharge: Medically stable for discharge by orthopedics    Disposition: Discharge med rec reviewed and our area of responsibility was addressed.  Being sent out with new prescriptions for Lexapro and lisinopril.  Should follow-up with primary care provider.  Should also have follow-up MRSA nasal screening swab done sometime after 12/20/2023.    Clinically Significant Risk Factors                  # Hypertension: Noted on problem list        # Obesity: Estimated body mass index is 38.87 kg/m  as calculated from the following:    Height as of this encounter: 1.638 m (5' 4.5\").    Weight as of this encounter: 104.3 kg (230 lb)., PRESENT ON ADMISSION     # Financial/Environmental Concerns: none         Interval History/Subjective:  Patient feeling better.  Pain under good control.  Emotionally doing better.  Was able to get some sleep last night.  No chest pain.  No shortness of breath.  No nausea or vomiting.  After discussion patient is agreeable to transition from metoprolol over to lisinopril.  Will plan to follow-up with her primary.  Questions answered to verbalized satisfaction.      Last 24H PRN:      methocarbamol (ROBAXIN) tablet 750 mg, 750 mg at 12/14/23 1829     ondansetron (ZOFRAN ODT) ODT tab 4 mg, 4 mg at 12/14/23 1236 **OR** ondansetron (ZOFRAN) injection 4 mg     prochlorperazine (COMPAZINE) injection 10 mg, 10 mg at 12/14/23 1515 **OR** prochlorperazine (COMPAZINE) tablet 10 mg    Physical Exam/Objective:  Temp:  [97.6  F (36.4  C)-98  F (36.7  C)] 97.9  F (36.6  C)  Pulse:  [69-74] 74  Resp:  [16-18] 18  BP: (175-182)/(82-96) 177/84  SpO2:  [90 %-96 %] 96 %  Wt " Readings from Last 4 Encounters:   12/12/23 104.3 kg (230 lb)     Body mass index is 38.87 kg/m .    Constitutional: awake, alert, cooperative, no apparent distress, and appears stated age  ENT: Limited exam secondary to cervical neck brace intact otherwise, Normocephalic, without obvious abnormality, atraumatic, external ears without lesions, oral pharynx with moist mucous membranes, tonsils without erythema or exudates, gums normal and good dentition.  Respiratory: No increased work of breathing, good air exchange, clear to auscultation bilaterally, no crackles or wheezing  Cardiovascular: Normal apical impulse, regular rate and rhythm, normal S1 and S2, no S3 or S4, and no murmur noted  GI: No scars, normal bowel sounds, soft, non-distended, non-tender, no masses palpated, no hepatosplenomegally  Skin: normal skin color, texture, turgor, no redness, warmth, or swelling, and no rashes  Musculoskeletal: There is no redness, warmth, or swelling of the joints.  Motor strength is 5 out of 5 all extremities bilaterally.  Tone is normal. no lower extremity pitting edema present  Neurologic: Cranial nerves II-XII are grossly intact. Sensory:  Sensory intact  Neuropsychiatric: General: normal, calm, and normal eye contact Level of consciousness: alert / normal Affect: normal Orientation: oriented to self, place, time and situation Memory and insight: normal, memory for past and recent events intact, and thought process normal      Medications:   Personally Reviewed.  Medications     sodium chloride Stopped (12/13/23 1051)       escitalopram  10 mg Oral Daily     hydrocortisone   Topical TID     metoprolol tartrate  25 mg Oral BID     multivitamin, therapeutic  1 tablet Oral Daily     [Held by provider] mupirocin   Topical Daily     polyethylene glycol  17 g Oral Daily     senna-docusate  1 tablet Oral BID       Data reviewed today: I personally reviewed all new medications, labs, imaging/diagnostics reports over the past  24 hours. Pertinent findings include:    Imaging:   No results found for this or any previous visit (from the past 24 hour(s)).    Labs:  XR Surgery BENIGNO  Fluoro G/T 5 Min   Final Result      XR Cervical Spine Port 2/3 Views    (Results Pending)     No results found for this or any previous visit (from the past 24 hour(s)).    Pending Labs:  Unresulted Labs Ordered in the Past 30 Days of this Admission       No orders found from 11/12/2023 to 12/13/2023.              Brian Delcid MD  [facility] Medicine  [facility]  Phone: #[phone]

## 2023-12-18 NOTE — PROGRESS NOTES
11:46 AM  SW received a call from [place] with [facility] intake.  [place] inquires when pt is discharging.  Per chart review, Pt discharged on 12/15 and CM updated [facility] of discharge and faxed orders.     OSCAR Rodriguez

## 2024-01-10 ENCOUNTER — DOCUMENTATION ONLY (OUTPATIENT)
Dept: ORTHOPEDICS | Facility: CLINIC | Age: 49
End: 2024-01-10

## 2024-01-10 DIAGNOSIS — R26.89 IMPAIRED GAIT AND MOBILITY: Primary | ICD-10-CM

## (undated) DEVICE — CATH IV 14GA 2IN REM FLASHPLUG DEHP-FR PVC FR 4251717-02

## (undated) DEVICE — PROTECTOR ARM STANDARD ONE STEP

## (undated) DEVICE — DRAPE C-ARM 60X42" 1013

## (undated) DEVICE — CUSTOM PACK LUMBAR FUSION SNE5BLFHEA

## (undated) DEVICE — ESU PENCIL SMOKE EVAC W/ROCKER SWITCH 0703-047-000

## (undated) DEVICE — BLADE KNIFE SURG 15 371115

## (undated) DEVICE — SOL NACL 0.9% IRRIG 1000ML BOTTLE 2F7124

## (undated) DEVICE — DRAPE STERI TOWEL LG 1010

## (undated) DEVICE — TOOL DISSECT MIDAS MR8 14CM MATCH HEAD 3MM MR8-14MH30

## (undated) DEVICE — TAPE ADH POROUS 3IN CURITY STD 7046C

## (undated) DEVICE — PREP CHLORAPREP W/ORANGE TINT 10.5ML 930715

## (undated) DEVICE — GLOVE BIOGEL PI INDICATOR 8.0 LF 41680

## (undated) DEVICE — ESU ELEC BLADE 2.75" COATED/INSULATED E1455

## (undated) DEVICE — SUTURE VICRYL+ 2-0 CT-2 27" UND VCP269H

## (undated) DEVICE — GLOVE SURG PI ULTRA TOUCH M SZ 7 LF 42670

## (undated) DEVICE — DRSG TEGADERM 4X4 3/4" 1626W

## (undated) DEVICE — Device

## (undated) DEVICE — DRAPE BACK TABLE PADDED 60X90

## (undated) DEVICE — DRSG GAUZE 4X4" 3033

## (undated) DEVICE — SUCTION MANIFOLD NEPTUNE 2 SYS 1 PORT 702-025-000

## (undated) DEVICE — SOL WATER IRRIG 1000ML BOTTLE 2F7114

## (undated) DEVICE — DRAIN RESERVOIR 100ML JP 0070740

## (undated) DEVICE — GLOVE SURG PI ULTRA TOUCH M SZ 8 LF

## (undated) DEVICE — IMM COLLAR CERVICAL MED UNIVERSAL 2.5X24" 79-83520

## (undated) DEVICE — GLOVE UNDER INDICATOR PI SZ 7.0 LF 41670

## (undated) DEVICE — PACK MINOR SINGLE BASIN SSK3001

## (undated) DEVICE — DRAIN JACKSON PRATT 07FR ROUND SU130-1320

## (undated) DEVICE — SPONGE KITNER DISSECTING 7102*

## (undated) DEVICE — ELECTRODE PATIENT RETURN ADULT L10 FT 2 PLATE CORD 0855C

## (undated) DEVICE — DRSG STERI STRIP 1/2X4" R1547

## (undated) DEVICE — SUTURE VICRYL+ 3-0 18IN PS-2 UND VCP497H

## (undated) DEVICE — DRAPE THYROID

## (undated) RX ORDER — FENTANYL CITRATE-0.9 % NACL/PF 10 MCG/ML
PLASTIC BAG, INJECTION (ML) INTRAVENOUS
Status: DISPENSED
Start: 2023-12-12

## (undated) RX ORDER — LIDOCAINE HYDROCHLORIDE 10 MG/ML
INJECTION, SOLUTION EPIDURAL; INFILTRATION; INTRACAUDAL; PERINEURAL
Status: DISPENSED
Start: 2023-12-12

## (undated) RX ORDER — ONDANSETRON 2 MG/ML
INJECTION INTRAMUSCULAR; INTRAVENOUS
Status: DISPENSED
Start: 2023-12-12

## (undated) RX ORDER — DEXAMETHASONE SODIUM PHOSPHATE 10 MG/ML
INJECTION, EMULSION INTRAMUSCULAR; INTRAVENOUS
Status: DISPENSED
Start: 2023-12-12

## (undated) RX ORDER — PROPOFOL 10 MG/ML
INJECTION, EMULSION INTRAVENOUS
Status: DISPENSED
Start: 2023-12-12

## (undated) RX ORDER — FENTANYL CITRATE 50 UG/ML
INJECTION, SOLUTION INTRAMUSCULAR; INTRAVENOUS
Status: DISPENSED
Start: 2023-12-12